# Patient Record
Sex: FEMALE
[De-identification: names, ages, dates, MRNs, and addresses within clinical notes are randomized per-mention and may not be internally consistent; named-entity substitution may affect disease eponyms.]

---

## 2017-05-27 NOTE — XRAY REPORT
FINAL REPORT



EXAM:  XR CHEST ROUTINE 2V



HISTORY:  sob/productive cough/fever 



COMPARISON:  None available. 



FINDINGS::  Frontal and lateral views of the chest obtained. Mild

cardiac enlargement. Prominent calcification aortic arch.

Prominent coronary artery calcifications. Shallow inspiration. No

focal consolidation or effusion. No pneumothorax. Moderate

degenerative changes of the thoracic spine. 



IMPRESSION::  Mild cardiac enlargement. Lungs are grossly clear.

## 2017-05-28 NOTE — EMERGENCY DEPARTMENT REPORT
- General


Chief Complaint: Dyspnea/Respdistress


Stated Complaint: CONGESTION/COLD SX


Time Seen by Provider: 05/27/17 23:40


Source: patient


Mode of arrival: Ambulatory


Limitations: No Limitations





- History of Present Illness


Initial Comments: 





82-year-old female with a past medical history of hypertension, dementia, 

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

, nasal congestion, generalized weakness, and body aches the last 2-3 days.  

Patient was put on Claritin and a nasal spray without improvement.  Patient 

complains of congestion to her nose and her sinuses.  No reports of fever, 

wheezing, shortness of breath.  Other than generalized aching that is 

intermittent and no significant pain reported at this time.





- Related Data


 Home Medications











 Medication  Instructions  Recorded  Confirmed  Last Taken


 


Alendronate Sodium [Fosamax] 70 mg PO QWEEK 08/30/16 08/30/16 Unknown


 


AtorvaSTATin [Lipitor] 40 mg PO DAILY 08/30/16 08/30/16 Unknown


 


Carvedilol [Coreg] 25 mg PO BID 08/30/16 08/30/16 Unknown


 


Fenofibrate [Tricor] 145 mg PO QDAY 08/30/16 08/30/16 Unknown


 


Hydrochlorothiazide [HCTZ] 50 mg PO QDAY 08/30/16 08/30/16 Unknown


 


Losartan [Cozaar] 100 mg PO QDAY 08/30/16 08/30/16 Unknown


 


Potassium Chloride [Klor-Con 8] 8 meq PO QDAY 08/30/16 08/30/16 Unknown


 


Tizanidine HCl [tiZANidine] 2 mg PO DAILY 08/30/16 08/30/16 Unknown


 


amLODIPine [Norvasc] 10 mg PO DAILY 08/30/16 08/30/16 Unknown








 Previous Rx's











 Medication  Instructions  Recorded  Last Taken  Type


 


Ibuprofen [Motrin] 600 mg PO Q8H PRN #10 tablet 08/30/16 Unknown Rx


 


Mupirocin [Bactroban 2%] 1 applic TP TID #1 tube 08/30/16 Unknown Rx


 


Levofloxacin [Levaquin TAB] 500 mg PO QDAY #5 tablet 05/28/17 Unknown Rx


 


Sodium Chloride [Saline Nasal 1 - 2 sprays NS PRN PRN #1 bottle 05/28/17 

Unknown Rx





Spray]    











 Allergies











Allergy/AdvReac Type Severity Reaction Status Date / Time


 


No Known Allergies Allergy   Unverified 08/30/16 09:30














ED Review of Systems


ROS: 


Stated complaint: CONGESTION/COLD SX


Other details as noted in HPI





Comment: All other systems reviewed and negative


Other: 





Constitutional: No fevers chills 


Eyes: No eye pain visual changes 


ENT: Nasal congestion


Neck: Denies pain


Respiratory: As per HPI


Cardiovascular: Denies chest pain, palpitations, syncope


GI: Denies abdominal pain, nausea, vomiting, diarrhea


: Denies dysuria


Musculoskeletal: Denies back pain, joint swelling 


Skin: Denies rash, lesions, erythema


Neurologic: Denies headache, numbness, weakness


Psychiatric: Denies suicidal ideation, hallucinations








ED Past Medical Hx





- Past Medical History


Hx Hypertension: Yes


Hx Dementia: Yes


Additional medical history: cardiac stent x2, DJD neck





- Surgical History


Hx Cholecystectomy: Yes


Additional Surgical History: bilat knee replacement





- Social History


Smoking Status: Never Smoker


Substance Use Type: None





- Medications


Home Medications: 


 Home Medications











 Medication  Instructions  Recorded  Confirmed  Last Taken  Type


 


Alendronate Sodium [Fosamax] 70 mg PO QWEEK 08/30/16 08/30/16 Unknown History


 


AtorvaSTATin [Lipitor] 40 mg PO DAILY 08/30/16 08/30/16 Unknown History


 


Carvedilol [Coreg] 25 mg PO BID 08/30/16 08/30/16 Unknown History


 


Fenofibrate [Tricor] 145 mg PO QDAY 08/30/16 08/30/16 Unknown History


 


Hydrochlorothiazide [HCTZ] 50 mg PO QDAY 08/30/16 08/30/16 Unknown History


 


Ibuprofen [Motrin] 600 mg PO Q8H PRN #10 tablet 08/30/16  Unknown Rx


 


Losartan [Cozaar] 100 mg PO QDAY 08/30/16 08/30/16 Unknown History


 


Mupirocin [Bactroban 2%] 1 applic TP TID #1 tube 08/30/16  Unknown Rx


 


Potassium Chloride [Klor-Con 8] 8 meq PO QDAY 08/30/16 08/30/16 Unknown History


 


Tizanidine HCl [tiZANidine] 2 mg PO DAILY 08/30/16 08/30/16 Unknown History


 


amLODIPine [Norvasc] 10 mg PO DAILY 08/30/16 08/30/16 Unknown History


 


Levofloxacin [Levaquin TAB] 500 mg PO QDAY #5 tablet 05/28/17  Unknown Rx


 


Sodium Chloride [Saline Nasal 1 - 2 sprays NS PRN PRN #1 bottle 05/28/17  

Unknown Rx





Spray]     














ED Physical Exam





- General


Limitations: No Limitations





- Other


Other exam information: 





General: No limitations, patient is alert in no acute distress


Head exam: Atraumatic, normocephalic


Eyes exam: Normal appearance


ENT: Moist mucous membrane, mild bilateral maxillary sinus tenderness


Neck exam: Normal inspection, full range of motion, no meningismus nontender


Respiratory exam: Clear to auscultation bilateral, no wheezes, rales, crackles


Cardiovascular: Normal rate and rhythm, normal heart sounds


Abdomen: Soft, nondistended, and  nontender, with normal bowel sounds, no 

rebound, or guarding


Extremity: Full range of motion normal inspection no deformity


Back: Normal Inspection, full range of motion, no tenderness


Neurologic: Alert, , cranial nerves intact, no motor or sensory deficit


Psychiatric: normal affect, normal mood


Skin: Warm, dry, intact





ED Course


 Vital Signs











  05/27/17





  21:50


 


Temperature 99.2 F


 


Pulse Rate 81


 


Respiratory 20





Rate 


 


Blood Pressure 123/52


 


O2 Sat by Pulse 96





Oximetry 














- Reevaluation(s)


Reevaluation #1: 








05/28/17 00:22


Levaquin ordered for UTI and possible sinusitis





ED Medical Decision Making





- Lab Data


Result diagrams: 


 05/27/17 22:01





 05/27/17 22:01








 Lab Results











  05/27/17 05/27/17 05/27/17 Range/Units





  22:01 22:01 23:16 


 


WBC  4.9    (4.5-11.0)  K/mm3


 


RBC  3.34 L    (3.65-5.03)  M/mm3


 


Hgb  11.1    (10.1-14.3)  gm/dl


 


Hct  32.4    (30.3-42.9)  %


 


MCV  97    (79-97)  fl


 


MCH  33 H    (28-32)  pg


 


MCHC  34    (30-34)  %


 


RDW  13.1 L    (13.2-15.2)  %


 


Plt Count  173    (140-440)  K/mm3


 


Sodium   139   (137-145)  mmol/L


 


Potassium   4.6   (3.6-5.0)  mmol/L


 


Chloride   100.7   ()  mmol/L


 


Carbon Dioxide   25   (22-30)  mmol/L


 


Anion Gap   18   mmol/L


 


BUN   26 H   (7-17)  mg/dL


 


Creatinine   0.8   (0.7-1.2)  mg/dL


 


Estimated GFR   > 60   ml/min


 


BUN/Creatinine Ratio   32.50   %


 


Glucose   92   ()  mg/dL


 


Calcium   8.7   (8.4-10.2)  mg/dL


 


Urine Color    Yellow  (Yellow)  


 


Urine Turbidity    Cloudy  (Clear)  


 


Urine pH    6.0  (5.0-7.0)  


 


Ur Specific Gravity    1.014  (1.003-1.030)  


 


Urine Protein    <15 mg/dl  (Negative)  mg/dL


 


Urine Glucose (UA)    Neg  (Negative)  mg/dL


 


Urine Ketones    Neg  (Negative)  mg/dL


 


Urine Blood    Neg  (Negative)  


 


Urine Nitrite    Neg  (Negative)  


 


Urine Bilirubin    Neg  (Negative)  


 


Urine Urobilinogen    4.0  (<2.0)  mg/dL


 


Ur Leukocyte Esterase    Lg  (Negative)  


 


Urine WBC (Auto)    82.0 H  (0.0-6.0)  /HPF


 


Urine RBC (Auto)    6.0  (0.0-6.0)  /HPF


 


U Epithel Cells (Auto)    11.0  (0-13.0)  /HPF


 


Urine Bacteria (Auto)    4+  (Negative)  /HPF


 


Hyaline Casts    2  /LPF


 


Urine Mucus    Few  /HPF














- Radiology Data


Radiology results: report reviewed (chest x-ray: Mild cardiomegaly no acute 

findings)





- Medical Decision Making





Patient be discharged on Levaquin to cover both UTI and sinusitis.  Urine 

culture ordered and pending.  Patient has follow-up with her doctor on Tuesday 

and therefore will be provided a copy of the labs for review by her physician.





- Differential Diagnosis


sinusitis, UTI, allergic rhinitis


Critical Care Time: No


Critical care attestation.: 


If time is entered above; I have spent that time in minutes in the direct care 

of this critically ill patient, excluding procedure time.








ED Disposition


Clinical Impression: 


 Sinusitis, Allergic rhinitis, UTI (urinary tract infection)





Disposition: DISCHARGED TO HOME OR SELFCARE


Is pt being admited?: No


Does the pt Need Aspirin: No


Condition: Stable


Instructions:  Sinusitis (ED), Urinary Tract Infection in Women (ED), Allergic 

Rhinitis (ED)


Additional Instructions: 


Take the medication as prescribed.  Follow-up with your doctor as scheduled.  

Take a copy of the labs results and chest x-ray results to your for follow-up


Prescriptions: 


Levofloxacin [Levaquin TAB] 500 mg PO QDAY #5 tablet


Sodium Chloride [Saline Nasal Spray] 1 - 2 sprays NS PRN PRN #1 bottle


 PRN Reason: Nasal Congestion


Referrals: 


PRIMARY CARE,MD [Primary Care Provider] - 3-5 Days


Time of Disposition: 00:28

## 2017-12-01 ENCOUNTER — HOSPITAL ENCOUNTER (OUTPATIENT)
Dept: HOSPITAL 5 - SPVWC | Age: 82
Discharge: HOME | End: 2017-12-01
Attending: FAMILY MEDICINE
Payer: MEDICARE
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
## 2017-12-01 DIAGNOSIS — I73.89: ICD-10-CM

## 2017-12-01 DIAGNOSIS — S09.90XA: Primary | ICD-10-CM
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
## 2017-12-01 DIAGNOSIS — G93.89: ICD-10-CM
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
## 2017-12-01 PROCEDURE — 70450 CT HEAD/BRAIN W/O DYE: CPT

## 2017-12-01 NOTE — CAT SCAN REPORT
CT HEAD WITHOUT CONTRAST: 12/01/17 10:39:00



CLINICAL: Headache.



TECHNIQUE: 2.5-mm noncontrast scans.



COMPARISON:06/06/17



FINDINGS: Slight enlargement of the lateral ventricles and third 

ventricle when compared to the prior exam.  The fourth ventricle is 

normal size. No mass or mass effect.  The sulci are unchanged in size.  

Slight increased bilateral periventricular white matter hypodensities. 

No hemorrhage, edema or extra-axial collection.  The sinuses are clear. 

 Normal orbits and soft tissues.  The calvarium and skull base are 

intact.



IMPRESSION: Mild ventriculomegaly with slight enlargement of the 

ventricles since the last exam.  However, no distinct etiology is 

identified for this change.  Moderate chronic white matter 

microangiopathy.  No evidence of acute/subacute infarct or hemorrhage.

## 2019-08-04 ENCOUNTER — HOSPITAL ENCOUNTER (INPATIENT)
Dept: HOSPITAL 5 - ED | Age: 84
LOS: 3 days | Discharge: NURSING, REHAB OR LONG-TERM CARE | DRG: 760 | End: 2019-08-07
Attending: INTERNAL MEDICINE | Admitting: INTERNAL MEDICINE
Payer: MEDICARE

## 2019-08-04 DIAGNOSIS — E78.5: ICD-10-CM

## 2019-08-04 DIAGNOSIS — Z90.49: ICD-10-CM

## 2019-08-04 DIAGNOSIS — I25.10: ICD-10-CM

## 2019-08-04 DIAGNOSIS — E87.6: ICD-10-CM

## 2019-08-04 DIAGNOSIS — N85.8: Primary | ICD-10-CM

## 2019-08-04 DIAGNOSIS — N95.0: ICD-10-CM

## 2019-08-04 DIAGNOSIS — I11.0: ICD-10-CM

## 2019-08-04 DIAGNOSIS — Z96.653: ICD-10-CM

## 2019-08-04 DIAGNOSIS — I50.9: ICD-10-CM

## 2019-08-04 DIAGNOSIS — Z95.5: ICD-10-CM

## 2019-08-04 DIAGNOSIS — F03.90: ICD-10-CM

## 2019-08-04 DIAGNOSIS — Z66: ICD-10-CM

## 2019-08-04 DIAGNOSIS — E43: ICD-10-CM

## 2019-08-04 PROCEDURE — 93010 ELECTROCARDIOGRAM REPORT: CPT

## 2019-08-04 PROCEDURE — 81001 URINALYSIS AUTO W/SCOPE: CPT

## 2019-08-04 PROCEDURE — 85014 HEMATOCRIT: CPT

## 2019-08-04 PROCEDURE — 76856 US EXAM PELVIC COMPLETE: CPT

## 2019-08-04 PROCEDURE — 85007 BL SMEAR W/DIFF WBC COUNT: CPT

## 2019-08-04 PROCEDURE — 93005 ELECTROCARDIOGRAM TRACING: CPT

## 2019-08-04 PROCEDURE — 85730 THROMBOPLASTIN TIME PARTIAL: CPT

## 2019-08-04 PROCEDURE — 80053 COMPREHEN METABOLIC PANEL: CPT

## 2019-08-04 PROCEDURE — 85610 PROTHROMBIN TIME: CPT

## 2019-08-04 PROCEDURE — 85025 COMPLETE CBC W/AUTO DIFF WBC: CPT

## 2019-08-04 PROCEDURE — 80048 BASIC METABOLIC PNL TOTAL CA: CPT

## 2019-08-04 PROCEDURE — 36415 COLL VENOUS BLD VENIPUNCTURE: CPT

## 2019-08-04 PROCEDURE — 85018 HEMOGLOBIN: CPT

## 2019-08-05 LAB
ALBUMIN SERPL-MCNC: 2.4 G/DL (ref 3.9–5)
ALT SERPL-CCNC: 8 UNITS/L (ref 7–56)
APTT BLD: 28 SEC. (ref 24.2–36.6)
BAND NEUTROPHILS # (MANUAL): 0 K/MM3
BUN SERPL-MCNC: 10 MG/DL (ref 7–17)
BUN SERPL-MCNC: 8 MG/DL (ref 7–17)
BUN/CREAT SERPL: 27 %
BUN/CREAT SERPL: 33 %
CALCIUM SERPL-MCNC: 7.8 MG/DL (ref 8.4–10.2)
CALCIUM SERPL-MCNC: 8.1 MG/DL (ref 8.4–10.2)
HCT VFR BLD CALC: 33.2 % (ref 30.3–42.9)
HCT VFR BLD CALC: 33.7 % (ref 30.3–42.9)
HEMOLYSIS INDEX: 14
HEMOLYSIS INDEX: 5
HGB BLD-MCNC: 11.2 GM/DL (ref 10.1–14.3)
HGB BLD-MCNC: 11.6 GM/DL (ref 10.1–14.3)
INR PPP: 1.03 (ref 0.87–1.13)
MCHC RBC AUTO-ENTMCNC: 34 % (ref 30–34)
MCV RBC AUTO: 98 FL (ref 79–97)
MYELOCYTES # (MANUAL): 0 K/MM3
PLATELET # BLD: 160 K/MM3 (ref 140–440)
PROMYELOCYTES # (MANUAL): 0 K/MM3
RBC # BLD AUTO: 3.41 M/MM3 (ref 3.65–5.03)
TOTAL CELLS COUNTED BLD: 100

## 2019-08-05 RX ADMIN — CARVEDILOL SCH MG: 12.5 TABLET, FILM COATED ORAL at 11:23

## 2019-08-05 RX ADMIN — POTASSIUM CHLORIDE SCH MLS/HR: 10 INJECTION, SOLUTION INTRAVENOUS at 03:34

## 2019-08-05 RX ADMIN — CARVEDILOL SCH MG: 12.5 TABLET, FILM COATED ORAL at 21:47

## 2019-08-05 RX ADMIN — POTASSIUM CHLORIDE SCH MLS/HR: 10 INJECTION, SOLUTION INTRAVENOUS at 06:20

## 2019-08-05 RX ADMIN — POTASSIUM CHLORIDE SCH MLS/HR: 10 INJECTION, SOLUTION INTRAVENOUS at 02:10

## 2019-08-05 RX ADMIN — DOCUSATE SODIUM SCH: 100 CAPSULE, LIQUID FILLED ORAL at 11:23

## 2019-08-05 RX ADMIN — Medication SCH ML: at 11:24

## 2019-08-05 RX ADMIN — DOCUSATE SODIUM SCH: 100 CAPSULE, LIQUID FILLED ORAL at 21:47

## 2019-08-05 RX ADMIN — Medication SCH: at 21:49

## 2019-08-05 RX ADMIN — POTASSIUM CHLORIDE SCH MLS/HR: 10 INJECTION, SOLUTION INTRAVENOUS at 05:02

## 2019-08-05 RX ADMIN — SODIUM CHLORIDE SCH MLS/HR: 0.9 INJECTION, SOLUTION INTRAVENOUS at 05:58

## 2019-08-05 RX ADMIN — SODIUM CHLORIDE SCH MLS/HR: 0.9 INJECTION, SOLUTION INTRAVENOUS at 20:39

## 2019-08-05 RX ADMIN — AMLODIPINE BESYLATE SCH MG: 10 TABLET ORAL at 11:23

## 2019-08-05 NOTE — PROGRESS NOTE
Assessment and Plan


Assessment and plan: 


Patient is a 85-year-old  woman who lives in an assisted living 

facility under [facility] with history of CAD s/p stent x2, HTN, dementia 

since [facility] ED via EMS with complaints of postmenopausal vaginal bleeding for the

past 18 hours.





Hypokalemia


-Potassium on admission 2.6


-Potassium repleted


-Continue to monitor electrolytes


-Replete as needed





Postmenopausal vaginal bleeding


-Evidence of blood at the introitus of the vaginal vault


-Secondary to uterine mass


-GYN consulted





Uterine mass


-Heterogeneous mass in the uterine fundus containing calcifications measuring 

4.3 x 3.6 x 3.3 cm. 


-GYN consulted





Moderate to severe malnutrition


-Albumin on admission 2.6


-Encourage oral intake


-Dietitian consult pending





HTN


-Monitor BP


-Resume home antihypertensive meds to optimize BP, once medication 

reconciliation has been completed


-IV hydralazine when necessary





CAD


-S/p stent x2


-Echo 6/8/17 showed: EF 55-60%, impaired relaxation, mild MR, mild TR, RVSP 

48mmHg, mild pulmonary HTN





DVT PPX


-on SCD's


-on Heparin





History of dementia





Medication reconciliation pending





prolonged inpatient services 32 minutes (admitted this morning)





ADvance directive says DNR, will order








History


Interval history: 


Patient was seen and examined. Follow-up on current diagnosis. No overnight 

events reported to me. Patient denies any chest pain, shortness breath, 

nausea/vomiting or severe headaches. Imaging, nursing note, chart, labs and old 

chart reviewed. Discussed with patient. 








Hospitalist Physical





- Physical exam


Narrative exam: 


Gen: WDWN, NAD, Awake, Alert, Orientated 


HEENT: NCAT, EOMI, PERRL, OP Clear 


Neck: supple, no adenopathy, no thyromegaly, no JVD 


CVS/Heart: RRR, normal S1S2, pulses present bilaterally 


Chest/Lungs: CTA B, Symmetrical chest expansion, good air entry bilaterally 


GI/Abdomen: soft, NTND, good bowel sounds, no guarding or rebound 


/Bladder: no suprapubic tenderness, no CVA or paraspinal tenderness 


Extermity/Skin: no c/c/e, no obvious rash 


MSK: FROM x 4 


Neuro: CN 2-12 grossly intact, no new focal deficits 


Psych: calm 








- Constitutional


Vitals: 


                                        











Temp Pulse Resp BP Pulse Ox


 


 97.7 F   70   20   162/78   96 


 


 08/05/19 06:04  08/05/19 06:04  08/05/19 06:04  08/05/19 06:04  08/05/19 06:04














Results





- Labs


CBC & Chem 7: 


                                 08/05/19 05:56





                                 08/05/19 00:25


Labs: 


                             Laboratory Last Values











WBC  4.1 K/mm3 (4.5-11.0)  L  08/05/19  00:25    


 


RBC  3.41 M/mm3 (3.65-5.03)  L  08/05/19  00:25    


 


Hgb  11.6 gm/dl (10.1-14.3)   08/05/19  05:56    


 


Hct  33.7 % (30.3-42.9)   08/05/19  05:56    


 


MCV  98 fl (79-97)  H  08/05/19  00:25    


 


MCH  33 pg (28-32)  H  08/05/19  00:25    


 


MCHC  34 % (30-34)   08/05/19  00:25    


 


RDW  15.9 % (13.2-15.2)  H  08/05/19  00:25    


 


Plt Count  160 K/mm3 (140-440)   08/05/19  00:25    


 


Lymph % (Auto)  Np   08/05/19  00:25    


 


Add Manual Diff  Complete   08/05/19  00:25    


 


Total Counted  100   08/05/19  00:25    


 


Seg Neutrophils %  Np   08/05/19  00:25    


 


Seg Neuts % (Manual)  29.0 % (40.0-70.0)  L  08/05/19  00:25    


 


  0 %  08/05/19  00:25    


 


  60.0 % (13.4-35.0)  H  08/05/19  00:25    


 


Reactive Lymphs % (Man)  0 %  08/05/19  00:25    


 


  10.0 % (0.0-7.3)  H  08/05/19  00:25    


 


  0 % (0.0-4.3)   08/05/19  00:25    


 


  1.0 % (0.0-1.8)   08/05/19  00:25    


 


  0 %  08/05/19  00:25    


 


  0 %  08/05/19  00:25    


 


  0 %  08/05/19  00:25    


 


  0 %  08/05/19  00:25    


 


Nucleated RBC %  Not Reportable   08/05/19  00:25    


 


Seg Neutrophils # Man  1.2 K/mm3 (1.8-7.7)  L  08/05/19  00:25    


 


Band Neutrophils #  0.0 K/mm3  08/05/19  00:25    


 


  2.5 K/mm3 (1.2-5.4)   08/05/19  00:25    


 


Abs React Lymphs (Man)  0.0 K/mm3  08/05/19  00:25    


 


  0.4 K/mm3 (0.0-0.8)   08/05/19  00:25    


 


  0.0 K/mm3 (0.0-0.4)   08/05/19  00:25    


 


  0.0 K/mm3 (0.0-0.1)   08/05/19  00:25    


 


  0.0 K/mm3  08/05/19  00:25    


 


  0.0 K/mm3  08/05/19  00:25    


 


  0.0 K/mm3  08/05/19  00:25    


 


Blast Cells #  0.0 K/mm3  08/05/19  00:25    


 


WBC Morphology  Not Reportable   08/05/19  00:25    


 


Hypersegmented Neuts  Not Reportable   08/05/19  00:25    


 


Hyposegmented Neuts  Not Reportable   08/05/19  00:25    


 


Hypogranular Neuts  Not Reportable   08/05/19  00:25    


 


  Not Reportable   08/05/19  00:25    


 


  Not Reportable   08/05/19  00:25    


 


  Not Reportable   08/05/19  00:25    


 


  Not Reportable   08/05/19  00:25    


 


  Not Reportable   08/05/19  00:25    


 


  Not Reportable   08/05/19  00:25    


 


  Not Reportable   08/05/19  00:25    


 


  Not Reportable   08/05/19  00:25    


 


Plt Clumps, EDTA  Not Reportable   08/05/19  00:25    


 


  Not Reportable   08/05/19  00:25    


 


  Not Reportable   08/05/19  00:25    


 


  Not Reportable   08/05/19  00:25    


 


Plt Morphology Comment  Not Reportable   08/05/19  00:25    


 


RBC Morphology  Normal   08/05/19  00:25    


 


Dimorphic RBCs  Not Reportable   08/05/19  00:25    


 


  Not Reportable   08/05/19  00:25    


 


  Not Reportable   08/05/19  00:25    


 


  Not Reportable   08/05/19  00:25    


 


  Not Reportable   08/05/19  00:25    


 


  Not Reportable   08/05/19  00:25    


 


  Not Reportable   08/05/19  00:25    


 


  Not Reportable   08/05/19  00:25    


 


  Not Reportable   08/05/19  00:25    


 


  Not Reportable   08/05/19  00:25    


 


  Not Reportable   08/05/19  00:25    


 


  Not Reportable   08/05/19  00:25    


 


  Not Reportable   08/05/19  00:25    


 


  Not Reportable   08/05/19  00:25    


 


  Not Reportable   08/05/19  00:25    


 


  Not Reportable   08/05/19  00:25    


 


  Not Reportable   08/05/19  00:25    


 


  Not Reportable   08/05/19  00:25    


 


  Not Reportable   08/05/19  00:25    


 


  Not Reportable   08/05/19  00:25    


 


Acanthocytes (Spur)  Not Reportable   08/05/19  00:25    


 


Rouleaux  Not Reportable   08/05/19  00:25    


 


  Not Reportable   08/05/19  00:25    


 


  Not Reportable   08/05/19  00:25    


 


  Not Reportable   08/05/19  00:25    


 


  Not Reportable   08/05/19  00:25    


 


Hem Pathologist Commnt  No   08/05/19  00:25    


 


PT  13.2 Sec. (12.2-14.9)   08/05/19  00:25    


 


INR  1.03  (0.87-1.13)   08/05/19  00:25    


 


APTT  28.0 Sec. (24.2-36.6)   08/05/19  00:25    


 


Sodium  145 mmol/L (137-145)   08/05/19  00:25    


 


Potassium  2.6 mmol/L (3.6-5.0)  L*  08/05/19  00:25    


 


Chloride  104.6 mmol/L ()   08/05/19  00:25    


 


Carbon Dioxide  32 mmol/L (22-30)  H  08/05/19  00:25    


 


  11 mmol/L  08/05/19  00:25    


 


BUN  10 mg/dL (7-17)   08/05/19  00:25    


 


  0.3 mg/dL (0.7-1.2)  L  08/05/19  00:25    


 


Estimated GFR  > 60 ml/min  08/05/19  00:25    


 


  33 %  08/05/19  00:25    


 


Glucose  74 mg/dL ()   08/05/19  00:25    


 


Calcium  7.8 mg/dL (8.4-10.2)  L  08/05/19  00:25    


 


  0.60 mg/dL (0.1-1.2)   08/05/19  00:25    


 


AST  39 units/L (5-40)   08/05/19  00:25    


 


ALT  8 units/L (7-56)   08/05/19  00:25    


 


  93 units/L ()   08/05/19  00:25    


 


  5.9 g/dL (6.3-8.2)  L  08/05/19  00:25    


 


  2.4 g/dL (3.9-5)  L  08/05/19  00:25    


 


  0.7 %  08/05/19  00:25    














Active Medications





- Current Medications


Current Medications: 














Generic Name Dose Route Start Last Admin





  Trade Name Freq  PRN Reason Stop Dose Admin


 


Acetaminophen  650 mg  08/05/19 03:05 





  Tylenol  PO  





  Q4H PRN  





  Pain MILD(1-3)/Fever >100.5/HA  


 


Amlodipine Besylate  10 mg  08/05/19 10:00 





  Norvasc  PO  





  QDAY [facility]  


 


Atorvastatin Calcium  40 mg  08/05/19 10:00 





  Lipitor  PO  





  DAILY [facility]  


 


Carvedilol  12.5 mg  08/05/19 10:00 





  Coreg  PO  





  BID [facility]  


 


Docusate Sodium  100 mg  08/05/19 10:00 





  Colace  PO  





  BID [facility]  


 


Hydralazine HCl  10 mg  08/05/19 03:09 





  Apresoline  IV  





  Q4HR PRN  





  Blood Pressure  


 


Sodium Chloride  1,000 mls @ 75 mls/hr  08/05/19 04:00  08/05/19 05:58





  Nacl 0.9% 1000 Ml  IV   75 mls/hr





  AS DIRECT REY   Administration


 


Ondansetron HCl  4 mg  08/05/19 03:05 





  Zofran  IV  





  Q8H PRN  





  Nausea And Vomiting  


 


Sodium Chloride  10 ml  08/05/19 10:00 





  Sodium Chloride Flush Syringe 10 Ml  IV  





  BID REY  


 


Sodium Chloride  10 ml  08/05/19 03:05 





  Sodium Chloride Flush Syringe 10 Ml  IV  





  PRN PRN  





  LINE FLUSH

## 2019-08-05 NOTE — ULTRASOUND REPORT
Transabdominal pelvic ultrasound



INDICATION: Abnormal bleeding



FINDINGS: Uterus measures 9.4 x 5.6 x 4.2 cm. There is a heterogeneous mass in the uterine fundus con
taining calcifications measuring 4.3 x 3.6 x 3.3 cm. Neither ovary is seen. No free fluid identified.




IMPRESSION: The uterine mass does not appear typical for a fibroid and malignancy would need to be co
nsidered.



Signer Name: Galo Martin MD 

Signed: 8/5/2019 1:27 AM

 Workstation Name: [id number]

## 2019-08-05 NOTE — HISTORY AND PHYSICAL REPORT
<BEN GRAVES - Last Filed: 08/05/19 03:36>





History of Present Illness


Date of examination: 08/05/19


Date of admission: 


08/05/2019


Chief complaint: 





Postmenopausal Vaginal bleeding


History of present illness: 





85-year-old  female who was side in assisted living facility with 

history of CAD s/p stent x2, HTN, dementia since [facility] ED via EMS with complaints

of postmenopausal vaginal bleeding.  Patient is a poor historian and unable to 

provide history.  History is provided EMS report and review of medical records. 

According to the assisted living staff patient has been experiencing vaginal 

bleeding for the past 18 hours. Pt  has been filling up her diapers with bloody 

contents. Pt states that she feels fine and there is nothing wrong with her. Pt 

also stated that she can't spend too much time with me because she has to go to 

.





Past History


Past Medical History: CAD (with ischemia, s/p stent x2), heart failure, 

hypertension, hyperlipidemia, other (degenerative joint disease neck, dementia, 

mild pulmonary hypertension)


Past Surgical History: cholecystectomy, Other (bilat knee replacement)


Social history: other (lives in assisted living facility)





Medications and Allergies


                                    Allergies











Allergy/AdvReac Type Severity Reaction Status Date / Time


 


No Known Allergies Allergy   Unverified 08/30/16 09:30











                                Home Medications











 Medication  Instructions  Recorded  Confirmed  Last Taken  Type


 


ALPRAZolam 0.5 mg PO Q4H PRN 08/05/19 08/05/19 Unknown History


 


Acetaminophen 650 mg PO Q4H PRN 08/05/19 08/05/19 Unknown History


 


LORazepam 1 mg PO Q4H PRN 08/05/19 08/05/19 Unknown History


 


Ondansetron 4 mg PO Q4H PRN 08/05/19 08/05/19 Unknown History


 


SEROquel 100 mg PO BID 08/05/19 08/05/19 Unknown History











Active Meds: 


Active Medications





Acetaminophen (Tylenol)  650 mg PO Q4H PRN


   PRN Reason: Pain MILD(1-3)/Fever >100.5/HA


Amlodipine Besylate (Norvasc)  10 mg PO QDAY REY


Atorvastatin Calcium (Lipitor)  40 mg PO DAILY REY


Carvedilol (Coreg)  12.5 mg PO BID REY


Docusate Sodium (Colace)  100 mg PO BID REY


Hydralazine HCl (Apresoline)  10 mg IV Q4HR PRN


   PRN Reason: Blood Pressure


Potassium Chloride (Kcl 10meq/100ml)  10 meq in 100 mls @ 100 mls/hr IV Q1H REY


   Stop: 08/05/19 05:59


   Last Admin: 08/05/19 02:10 Dose:  100 mls/hr


   Documented by: 


Sodium Chloride (Nacl 0.9% 1000 Ml)  1,000 mls @ 75 mls/hr IV AS DIRECT REY


Ondansetron HCl (Zofran)  4 mg IV Q8H PRN


   PRN Reason: Nausea And Vomiting


Sodium Chloride (Sodium Chloride Flush Syringe 10 Ml)  10 ml IV BID REY


Sodium Chloride (Sodium Chloride Flush Syringe 10 Ml)  10 ml IV PRN PRN


   PRN Reason: LINE FLUSH











Review of Systems


ROS unobtainable: due to mental status





Exam





- Physical Exam


Narrative exam: 





Physical exam





General appearance: Present: No acute distress, awake, oriented to self, older 

adult  female





- EENT


Eyes: Present: PERRL, EOM intact


ENT: hearing intact, normal dentition





- Neck


Neck: Present: supple, normal ROM





- Respiratory


Respiratory effort: Non-labored


Respiratory: bilateral: diminished (bases)





- Cardiovascular


Heart rate:  66(bpm)


Rhythm: SR


Heart Sounds: Present: S1 & S2.  Absent: rub, click





- Extremities


Extremities: no ischemia, pulses intact,








- Peripheral Assessment


Peripheral Pulses: within normal limits


 


- Abdominal


General gastrointestinal: soft, non-tender, normal bowel sounds





Pelvic:Evidence of blood at the introitus of the vaginal vault.  No active 

hemorrhaging visualized externally





- Integumentary


Integumentary: Present: warm, dry





- Musculoskeletal


Musculoskeletal: Able to move all extremities





-Neurological


Neurological: CN II-XII grossly intact





- Psychiatric


Psychiatric: Confused but cooperative





- Constitutional


Vitals: 


                                        











Temp Pulse Resp BP Pulse Ox


 


 98.9 F   74   16      95 


 


 08/05/19 00:05  08/05/19 00:05  08/05/19 00:52     08/05/19 00:05














Results





- Labs


CBC & Chem 7: 


                                 08/05/19 00:25





                                 08/05/19 00:25


Labs: 


                             Laboratory Last Values











WBC  4.1 K/mm3 (4.5-11.0)  L  08/05/19  00:25    


 


RBC  3.41 M/mm3 (3.65-5.03)  L  08/05/19  00:25    


 


Hgb  11.2 gm/dl (10.1-14.3)   08/05/19  00:25    


 


Hct  33.2 % (30.3-42.9)   08/05/19  00:25    


 


MCV  98 fl (79-97)  H  08/05/19  00:25    


 


MCH  33 pg (28-32)  H  08/05/19  00:25    


 


MCHC  34 % (30-34)   08/05/19  00:25    


 


RDW  15.9 % (13.2-15.2)  H  08/05/19  00:25    


 


Plt Count  160 K/mm3 (140-440)   08/05/19  00:25    


 


Lymph % (Auto)  Np   08/05/19  00:25    


 


Add Manual Diff  Complete   08/05/19  00:25    


 


Total Counted  100   08/05/19  00:25    


 


Seg Neutrophils %  Np   08/05/19  00:25    


 


Seg Neuts % (Manual)  29.0 % (40.0-70.0)  L  08/05/19  00:25    


 


  0 %  08/05/19  00:25    


 


  60.0 % (13.4-35.0)  H  08/05/19  00:25    


 


Reactive Lymphs % (Man)  0 %  08/05/19  00:25    


 


  10.0 % (0.0-7.3)  H  08/05/19  00:25    


 


  0 % (0.0-4.3)   08/05/19  00:25    


 


  1.0 % (0.0-1.8)   08/05/19  00:25    


 


  0 %  08/05/19  00:25    


 


  0 %  08/05/19  00:25    


 


  0 %  08/05/19  00:25    


 


  0 %  08/05/19  00:25    


 


Nucleated RBC %  Not Reportable   08/05/19  00:25    


 


Seg Neutrophils # Man  1.2 K/mm3 (1.8-7.7)  L  08/05/19  00:25    


 


Band Neutrophils #  0.0 K/mm3  08/05/19  00:25    


 


  2.5 K/mm3 (1.2-5.4)   08/05/19  00:25    


 


Abs React Lymphs (Man)  0.0 K/mm3  08/05/19  00:25    


 


  0.4 K/mm3 (0.0-0.8)   08/05/19  00:25    


 


  0.0 K/mm3 (0.0-0.4)   08/05/19  00:25    


 


  0.0 K/mm3 (0.0-0.1)   08/05/19  00:25    


 


  0.0 K/mm3  08/05/19  00:25    


 


  0.0 K/mm3  08/05/19  00:25    


 


  0.0 K/mm3  08/05/19  00:25    


 


Blast Cells #  0.0 K/mm3  08/05/19  00:25    


 


WBC Morphology  Not Reportable   08/05/19  00:25    


 


Hypersegmented Neuts  Not Reportable   08/05/19  00:25    


 


Hyposegmented Neuts  Not Reportable   08/05/19  00:25    


 


Hypogranular Neuts  Not Reportable   08/05/19  00:25    


 


  Not Reportable   08/05/19  00:25    


 


  Not Reportable   08/05/19  00:25    


 


  Not Reportable   08/05/19  00:25    


 


  Not Reportable   08/05/19  00:25    


 


  Not Reportable   08/05/19  00:25    


 


  Not Reportable   08/05/19  00:25    


 


  Not Reportable   08/05/19  00:25    


 


  Not Reportable   08/05/19  00:25    


 


Plt Clumps, EDTA  Not Reportable   08/05/19  00:25    


 


  Not Reportable   08/05/19  00:25    


 


  Not Reportable   08/05/19  00:25    


 


  Not Reportable   08/05/19  00:25    


 


Plt Morphology Comment  Not Reportable   08/05/19  00:25    


 


RBC Morphology  Normal   08/05/19  00:25    


 


Dimorphic RBCs  Not Reportable   08/05/19  00:25    


 


  Not Reportable   08/05/19  00:25    


 


  Not Reportable   08/05/19  00:25    


 


  Not Reportable   08/05/19  00:25    


 


  Not Reportable   08/05/19  00:25    


 


  Not Reportable   08/05/19  00:25    


 


  Not Reportable   08/05/19  00:25    


 


  Not Reportable   08/05/19  00:25    


 


  Not Reportable   08/05/19  00:25    


 


  Not Reportable   08/05/19  00:25    


 


  Not Reportable   08/05/19  00:25    


 


  Not Reportable   08/05/19  00:25    


 


  Not Reportable   08/05/19  00:25    


 


  Not Reportable   08/05/19  00:25    


 


  Not Reportable   08/05/19  00:25    


 


  Not Reportable   08/05/19  00:25    


 


  Not Reportable   08/05/19  00:25    


 


  Not Reportable   08/05/19  00:25    


 


  Not Reportable   08/05/19  00:25    


 


  Not Reportable   08/05/19  00:25    


 


Acanthocytes (Spur)  Not Reportable   08/05/19  00:25    


 


Rouleaux  Not Reportable   08/05/19  00:25    


 


  Not Reportable   08/05/19  00:25    


 


  Not Reportable   08/05/19  00:25    


 


  Not Reportable   08/05/19  00:25    


 


  Not Reportable   08/05/19  00:25    


 


Hem Pathologist Commnt  No   08/05/19  00:25    


 


PT  13.2 Sec. (12.2-14.9)   08/05/19  00:25    


 


INR  1.03  (0.87-1.13)   08/05/19  00:25    


 


APTT  28.0 Sec. (24.2-36.6)   08/05/19  00:25    


 


Sodium  145 mmol/L (137-145)   08/05/19  00:25    


 


Potassium  2.6 mmol/L (3.6-5.0)  L*  08/05/19  00:25    


 


Chloride  104.6 mmol/L ()   08/05/19  00:25    


 


Carbon Dioxide  32 mmol/L (22-30)  H  08/05/19  00:25    


 


  11 mmol/L  08/05/19  00:25    


 


BUN  10 mg/dL (7-17)   08/05/19  00:25    


 


  0.3 mg/dL (0.7-1.2)  L  08/05/19  00:25    


 


Estimated GFR  > 60 ml/min  08/05/19  00:25    


 


  33 %  08/05/19  00:25    


 


Glucose  74 mg/dL ()   08/05/19  00:25    


 


Calcium  7.8 mg/dL (8.4-10.2)  L  08/05/19  00:25    


 


  0.60 mg/dL (0.1-1.2)   08/05/19  00:25    


 


AST  39 units/L (5-40)   08/05/19  00:25    


 


ALT  8 units/L (7-56)   08/05/19  00:25    


 


  93 units/L ()   08/05/19  00:25    


 


  5.9 g/dL (6.3-8.2)  L  08/05/19  00:25    


 


  2.4 g/dL (3.9-5)  L  08/05/19  00:25    


 


  0.7 %  08/05/19  00:25    














- Imaging and Cardiology


Imaging and Cardiology: 





Pelvic ultrasound:


Findings: Uterus measures 9.4 x 5.6 x 4.2 cm. There is a heterogeneous mass in 

the uterine fundus 


containing calcifications measuring 4.3 x 3.6 x 3.3 cm. Neither ovary is seen. 

No free fluid 


identified. 





Impressions: The uterine mass does not appear typical for a fibroid and 

malignancy would need to be 


considered. 








Assessment and Plan


Assessment and plan: 





85-year-old  female who was side in assisted living facility with 

history of CAD s/p stent x2, HTN, dementia since [facility] ED via EMS with complaints

of postmenopausal vaginal bleeding for the past 18 hours.





Hypokalemia


-Potassium on admission 2.6


-Potassium repleted


-Continue to monitor electrolytes


-Replete as needed





Postmenopausal vaginal bleeding


-Evidence of blood at the introitus of the vaginal vault


-Secondary to uterine mass


-GYN consulted





Uterine mass


-Heterogeneous mass in the uterine fundus containing calcifications measuring 

4.3 x 3.6 x 3.3 cm. 


-GYN consulted





Moderate to severe malnutrition


-Albumin on admission 2.6


-Encourage oral intake


-Dietitian consult pending





HTN


-Monitor BP


-Resume home antihypertensive meds to optimize BP, once medication recon

ciliation has been completed


-IV hydralazine when necessary





CAD


-S/p stent x2


-Echo 6/8/17 showed: EF 55-60%, impaired relaxation, mild MR, mild TR, RVSP 

48mmHg, mild pulmonary HTN





DVT PPX


-on SCD's


-on Heparin





History of dementia





Medication reconciliation pending





Advance Directives: No


VTE prophylaxis?: Chemical, Mechanical


Plan of care discussed with patient/family: Yes





<ANNMARIE RAMSAY - Last Filed: 08/05/19 05:23>





History of Present Illness


Date of admission: 


08/05/19 03:05








Medications and Allergies


Active Meds: 


Active Medications





Acetaminophen (Tylenol)  650 mg PO Q4H PRN


   PRN Reason: Pain MILD(1-3)/Fever >100.5/HA


Amlodipine Besylate (Norvasc)  10 mg PO QDAY REY


Atorvastatin Calcium (Lipitor)  40 mg PO DAILY REY


Carvedilol (Coreg)  12.5 mg PO BID [facility]


Docusate Sodium (Colace)  100 mg PO BID [facility]


Heparin Sodium (Porcine) (Heparin)  5,000 unit SUB-Q Q12HR [facility]


Hydralazine HCl (Apresoline)  10 mg IV Q4HR PRN


   PRN Reason: Blood Pressure


Potassium Chloride (Kcl 10meq/100ml)  10 meq in 100 mls @ 100 mls/hr IV Q1H REY


   Stop: 08/05/19 05:59


   Last Admin: 08/05/19 05:02 Dose:  100 mls/hr


   Documented by: 


Sodium Chloride (Nacl 0.9% 1000 Ml)  1,000 mls @ 75 mls/hr IV AS DIRECT REY


Ondansetron HCl (Zofran)  4 mg IV Q8H PRN


   PRN Reason: Nausea And Vomiting


Sodium Chloride (Sodium Chloride Flush Syringe 10 Ml)  10 ml IV BID REY


Sodium Chloride (Sodium Chloride Flush Syringe 10 Ml)  10 ml IV PRN PRN


   PRN Reason: LINE FLUSH











Exam





- Constitutional


Vitals: 


                                        











Temp Pulse Resp BP Pulse Ox


 


 98.9 F   71   20   164/77   100 


 


 08/05/19 00:05  08/05/19 05:00  08/05/19 05:00  08/05/19 05:00  08/05/19 05:00














Results





- Labs


CBC & Chem 7: 


                                 08/05/19 00:25





                                 08/05/19 00:25


Labs: 


                             Laboratory Last Values











WBC  4.1 K/mm3 (4.5-11.0)  L  08/05/19  00:25    


 


RBC  3.41 M/mm3 (3.65-5.03)  L  08/05/19  00:25    


 


Hgb  11.2 gm/dl (10.1-14.3)   08/05/19  00:25    


 


Hct  33.2 % (30.3-42.9)   08/05/19  00:25    


 


MCV  98 fl (79-97)  H  08/05/19  00:25    


 


MCH  33 pg (28-32)  H  08/05/19  00:25    


 


MCHC  34 % (30-34)   08/05/19  00:25    


 


RDW  15.9 % (13.2-15.2)  H  08/05/19  00:25    


 


Plt Count  160 K/mm3 (140-440)   08/05/19  00:25    


 


Lymph % (Auto)  Np   08/05/19  00:25    


 


Add Manual Diff  Complete   08/05/19  00:25    


 


Total Counted  100   08/05/19  00:25    


 


Seg Neutrophils %  Np   08/05/19  00:25    


 


Seg Neuts % (Manual)  29.0 % (40.0-70.0)  L  08/05/19  00:25    


 


  0 %  08/05/19  00:25    


 


  60.0 % (13.4-35.0)  H  08/05/19  00:25    


 


Reactive Lymphs % (Man)  0 %  08/05/19  00:25    


 


  10.0 % (0.0-7.3)  H  08/05/19  00:25    


 


  0 % (0.0-4.3)   08/05/19  00:25    


 


  1.0 % (0.0-1.8)   08/05/19  00:25    


 


  0 %  08/05/19  00:25    


 


  0 %  08/05/19  00:25    


 


  0 %  08/05/19  00:25    


 


  0 %  08/05/19  00:25    


 


Nucleated RBC %  Not Reportable   08/05/19  00:25    


 


Seg Neutrophils # Man  1.2 K/mm3 (1.8-7.7)  L  08/05/19  00:25    


 


Band Neutrophils #  0.0 K/mm3  08/05/19  00:25    


 


  2.5 K/mm3 (1.2-5.4)   08/05/19  00:25    


 


Abs React Lymphs (Man)  0.0 K/mm3  08/05/19  00:25    


 


  0.4 K/mm3 (0.0-0.8)   08/05/19  00:25    


 


  0.0 K/mm3 (0.0-0.4)   08/05/19  00:25    


 


  0.0 K/mm3 (0.0-0.1)   08/05/19  00:25    


 


  0.0 K/mm3  08/05/19  00:25    


 


  0.0 K/mm3  08/05/19  00:25    


 


  0.0 K/mm3  08/05/19  00:25    


 


Blast Cells #  0.0 K/mm3  08/05/19  00:25    


 


WBC Morphology  Not Reportable   08/05/19  00:25    


 


Hypersegmented Neuts  Not Reportable   08/05/19  00:25    


 


Hyposegmented Neuts  Not Reportable   08/05/19  00:25    


 


Hypogranular Neuts  Not Reportable   08/05/19  00:25    


 


  Not Reportable   08/05/19  00:25    


 


  Not Reportable   08/05/19  00:25    


 


  Not Reportable   08/05/19  00:25    


 


  Not Reportable   08/05/19  00:25    


 


  Not Reportable   08/05/19  00:25    


 


  Not Reportable   08/05/19  00:25    


 


  Not Reportable   08/05/19  00:25    


 


  Not Reportable   08/05/19  00:25    


 


Plt Clumps, EDTA  Not Reportable   08/05/19  00:25    


 


  Not Reportable   08/05/19  00:25    


 


  Not Reportable   08/05/19  00:25    


 


  Not Reportable   08/05/19  00:25    


 


Plt Morphology Comment  Not Reportable   08/05/19  00:25    


 


RBC Morphology  Normal   08/05/19  00:25    


 


Dimorphic RBCs  Not Reportable   08/05/19  00:25    


 


  Not Reportable   08/05/19  00:25    


 


  Not Reportable   08/05/19  00:25    


 


  Not Reportable   08/05/19  00:25    


 


  Not Reportable   08/05/19  00:25    


 


  Not Reportable   08/05/19  00:25    


 


  Not Reportable   08/05/19  00:25    


 


  Not Reportable   08/05/19  00:25    


 


  Not Reportable   08/05/19  00:25    


 


  Not Reportable   08/05/19  00:25    


 


  Not Reportable   08/05/19  00:25    


 


  Not Reportable   08/05/19  00:25    


 


  Not Reportable   08/05/19  00:25    


 


  Not Reportable   08/05/19  00:25    


 


  Not Reportable   08/05/19  00:25    


 


  Not Reportable   08/05/19  00:25    


 


  Not Reportable   08/05/19  00:25    


 


  Not Reportable   08/05/19  00:25    


 


  Not Reportable   08/05/19  00:25    


 


  Not Reportable   08/05/19  00:25    


 


Acanthocytes (Spur)  Not Reportable   08/05/19  00:25    


 


Rouleaux  Not Reportable   08/05/19  00:25    


 


  Not Reportable   08/05/19  00:25    


 


  Not Reportable   08/05/19  00:25    


 


  Not Reportable   08/05/19  00:25    


 


  Not Reportable   08/05/19  00:25    


 


Hem Pathologist Commnt  No   08/05/19  00:25    


 


PT  13.2 Sec. (12.2-14.9)   08/05/19  00:25    


 


INR  1.03  (0.87-1.13)   08/05/19  00:25    


 


APTT  28.0 Sec. (24.2-36.6)   08/05/19  00:25    


 


Sodium  145 mmol/L (137-145)   08/05/19  00:25    


 


Potassium  2.6 mmol/L (3.6-5.0)  L*  08/05/19  00:25    


 


Chloride  104.6 mmol/L ()   08/05/19  00:25    


 


Carbon Dioxide  32 mmol/L (22-30)  H  08/05/19  00:25    


 


  11 mmol/L  08/05/19  00:25    


 


BUN  10 mg/dL (7-17)   08/05/19  00:25    


 


  0.3 mg/dL (0.7-1.2)  L  08/05/19  00:25    


 


Estimated GFR  > 60 ml/min  08/05/19  00:25    


 


  33 %  08/05/19  00:25    


 


Glucose  74 mg/dL ()   08/05/19  00:25    


 


Calcium  7.8 mg/dL (8.4-10.2)  L  08/05/19  00:25    


 


  0.60 mg/dL (0.1-1.2)   08/05/19  00:25    


 


AST  39 units/L (5-40)   08/05/19  00:25    


 


ALT  8 units/L (7-56)   08/05/19  00:25    


 


  93 units/L ()   08/05/19  00:25    


 


  5.9 g/dL (6.3-8.2)  L  08/05/19  00:25    


 


  2.4 g/dL (3.9-5)  L  08/05/19  00:25    


 


  0.7 %  08/05/19  00:25    














Assessment and Plan


Assessment and plan: 


85 year old woman with hypertension, dementia, CAD sent to ER for evaluation of 

vaginal bleed. Agree with GYN consult, discontinue heparin and check serial 

hemoglobin. Check BMP, follow potassium level

## 2019-08-05 NOTE — EMERGENCY DEPARTMENT REPORT
HPI





- General


Chief Complaint: Vaginal Bleeding


Time Seen by Provider: 19 00:05





- HPI


HPI: 





Room 22











The patient is an 85-year-old female presenting with a chief complaint of 

vaginal bleeding.  The patient is currently at an assisted living facility and 

the report for the past 18 hours the patient's had vaginal bleeding.  They state

the patient has been filling up her diapers.  Patient has a history of dementia 

and is unable to provide history.  Patient denies complaints stating she feels 

fine.











Location: [See above]


Duration: [See above]


Quality:  [See above]


Severity:  [See above]


Modifying factors: [see above]


Context: [see above]


Mode of transportation: [not driving]





ED Past Medical Hx





- Past Medical History


Previous Medical History?: Yes


Hx Hypertension: Yes


Hx Dementia: Yes


Additional medical history: cardiac stent x2, DJD neck





- Surgical History


Past Surgical History?: Yes


Hx Cholecystectomy: Yes


Additional Surgical History: bilat knee replacement





- Family History


Family history: no significant





- Social History


Smoking Status: Never Smoker


Substance Use Type: None





- Medications


Home Medications: 


                                Home Medications











 Medication  Instructions  Recorded  Confirmed  Last Taken  Type


 


Alendronate Sodium [Fosamax] 70 mg PO QWEEK 16 1 Day Ago History





    ~17 


 


AtorvaSTATin [Lipitor] 40 mg PO DAILY 16 1 Day Ago History





    ~17 


 


Fenofibrate [Tricor] 145 mg PO QDAY 16 1 Day Ago History





    ~17 


 


Ibuprofen [Motrin 600 MG tab] 600 mg PO Q8H PRN #10 tablet 16 1 

Day Ago Rx





    ~17 


 


Mupirocin [Bactroban 2% OINT] 1 applic TP TID #1 tube 16 1 Day 

Ago Rx





    ~17 


 


Potassium Chloride [Klor-Con 8] 8 meq PO QDAY 16 1 Day Ago 

History





    ~17 


 


Tizanidine HCl [tiZANidine] 2 mg PO DAILY 16 1 Day Ago History





    ~17 


 


amLODIPine [Norvasc] 10 mg PO DAILY 16 1 Day Ago History





    ~17 


 


Sodium Chloride [Saline Nasal 1 - 2 sprays NS PRN PRN #1 bottle 17 1 Day Ago Rx





Spray]    ~17 


 


levoFLOXacin [Levaquin TAB] 500 mg PO QDAY #5 tablet 17 1 Day Ago

Rx





    ~17 


 


Aspirin [Aspirin BABY CHEW TAB] 81 mg PO QDAY #30 tab.chew 17  Unknown Rx


 


Carvedilol [Coreg] 12.5 mg PO BID #60 tablet 17  Unknown Rx


 


Losartan [Cozaar] 50 mg PO QDAY #30 tablet 17  Unknown Rx














ED Review of Systems


ROS: 


Stated complaint: VAGINAL BLEEDING


Other details as noted in HPI





Comment: Unobtainable due to pts medical conditions (dementia)





Physical Exam





- Physical Exam


Vital Signs: 


                                   Vital Signs











  19





  23:59 00:01 00:05


 


Temperature   98.9 F


 


Pulse Rate   74


 


Respiratory   16





Rate   


 


O2 Sat by Pulse 96 95 95





Oximetry   











Physical Exam: 





GENERAL: The patient is well-developed well-nourished female lying on stretcher 

not appearing to be in acute distress. []


HEENT: Normocephalic.  Atraumatic.  Extraocular motions are intact.  Patient has

 moist mucous membranes.


NECK: Supple.  Trachea midline


CHEST/LUNGS: Clear to auscultation.  There is no respiratory distress noted.


HEART/CARDIOVASCULAR: Regular.  There is no tachycardia.  There is no gallop rub

 or murmur.


ABDOMEN: Abdomen is soft, nontender.  Patient has normal bowel sounds.  There is

 no abdominal distention.


SKIN: There is no rash.  There is no edema.  There is no diaphoresis.


NEURO: The patient is awake and alert.  The patient is cooperative.  The patient

 has normal speech


MUSCULOSKELETAL: There is no evidence of acute injury.


PELVIC: Evidence of blood at the introitus of the vaginal vault.  No active 

hemorrhaging visualized externally





ED Course


                                   Vital Signs











  19





  23:59 00:01 00:05


 


Temperature   98.9 F


 


Pulse Rate   74


 


Respiratory   16





Rate   


 


O2 Sat by Pulse 96 95 95





Oximetry   














ED Medical Decision Making





- Lab Data


Result diagrams: 


                                 19 00:25





                                 19 00:25





                                Laboratory Tests











  19





  00:25 00:25 00:25


 


WBC  4.1 L  


 


RBC  3.41 L  


 


Hgb  11.2  


 


Hct  33.2  


 


MCV  98 H  


 


MCH  33 H  


 


MCHC  34  


 


RDW  15.9 H  


 


Plt Count  160  


 


Lymph % (Auto)  Np  


 


Add Manual Diff  Complete  


 


Total Counted  100  


 


Seg Neutrophils %  Np  


 


Seg Neuts % (Manual)  29.0 L  


 


Band Neutrophils %  0  


 


Lymphocytes % (Manual)  60.0 H  


 


Reactive Lymphs % (Man)  0  


 


Monocytes % (Manual)  10.0 H  


 


Eosinophils % (Manual)  0  


 


Basophils % (Manual)  1.0  


 


Metamyelocytes %  0  


 


Myelocytes %  0  


 


Promyelocytes %  0  


 


Blast Cells %  0  


 


Nucleated RBC %  Not Reportable  


 


Seg Neutrophils # Man  1.2 L  


 


Band Neutrophils #  0.0  


 


Lymphocytes # (Manual)  2.5  


 


Abs React Lymphs (Man)  0.0  


 


Monocytes # (Manual)  0.4  


 


Eosinophils # (Manual)  0.0  


 


Basophils # (Manual)  0.0  


 


Metamyelocytes #  0.0  


 


Myelocytes #  0.0  


 


Promyelocytes #  0.0  


 


Blast Cells #  0.0  


 


WBC Morphology  Not Reportable  


 


Hypersegmented Neuts  Not Reportable  


 


Hyposegmented Neuts  Not Reportable  


 


Hypogranular Neuts  Not Reportable  


 


Smudge Cells  Not Reportable  


 


Toxic Granulation  Not Reportable  


 


Toxic Vacuolation  Not Reportable  


 


Dohle Bodies  Not Reportable  


 


Pelger-Huet Anomaly  Not Reportable  


 


Eleonora Rods  Not Reportable  


 


Platelet Estimate  Not Reportable  


 


Clumped Platelets  Not Reportable  


 


Plt Clumps, EDTA  Not Reportable  


 


Large Platelets  Not Reportable  


 


Giant Platelets  Not Reportable  


 


Platelet Satelliting  Not Reportable  


 


Plt Morphology Comment  Not Reportable  


 


RBC Morphology  Normal  


 


Dimorphic RBCs  Not Reportable  


 


Polychromasia  Not Reportable  


 


Hypochromasia  Not Reportable  


 


Poikilocytosis  Not Reportable  


 


Anisocytosis  Not Reportable  


 


Microcytosis  Not Reportable  


 


Macrocytosis  Not Reportable  


 


Spherocytes  Not Reportable  


 


Pappenheimer Bodies  Not Reportable  


 


Sickle Cells  Not Reportable  


 


Target Cells  Not Reportable  


 


Tear Drop Cells  Not Reportable  


 


Ovalocytes  Not Reportable  


 


Helmet Cells  Not Reportable  


 


[place] Bodies  Not Reportable  


 


Cabot Rings  Not Reportable  


 


[place] Cells  Not Reportable  


 


Bite Cells  Not Reportable  


 


Crenated Cell  Not Reportable  


 


Elliptocytes  Not Reportable  


 


Acanthocytes (Spur)  Not Reportable  


 


Rouleaux  Not Reportable  


 


Hemoglobin C Crystals  Not Reportable  


 


Schistocytes  Not Reportable  


 


Malaria parasites  Not Reportable  


 


Stevie Bodies  Not Reportable  


 


Hem Pathologist Commnt  No  


 


PT   13.2 


 


INR   1.03 


 


APTT   28.0 


 


Sodium    145


 


Potassium    2.6 L*


 


Chloride    104.6


 


Carbon Dioxide    32 H


 


Anion Gap    11


 


BUN    10


 


Creatinine    0.3 L


 


Estimated GFR    > 60


 


BUN/Creatinine Ratio    33


 


Glucose    74


 


Calcium    7.8 L


 


Total Bilirubin    0.60


 


AST    39


 


ALT    8


 


Alkaline Phosphatase    93


 


Total Protein    5.9 L


 


Albumin    2.4 L


 


Albumin/Globulin Ratio    0.7














- EKG Data


-: EKG Interpreted by Me


EKG shows normal: sinus rhythm


Rate: normal





- EKG Data


When compared to previous EKG there are: no significant change


Interpretation: other (occasional PAC)





- Radiology Data


Radiology results: report reviewed (pelvic ultrasound), image reviewed (pelvic 

ultrasound)





[facility] [address] [place] [address] 

Ultrasound Report Signed Patient: PRASHANTH TAVARES MR#: [id number] : 

1934 Acct:[id number] Age/Sex: 85 / F ADM Date: 19 Loc: ED 

Attending Dr: Ordering Physician: FACUNDO AVITIA MD Date of Service: 19 

Procedure(s): US pelvic complete Accession Number(s): [id number] cc: FACUNDO AVITIA MD Transabdominal pelvic ultrasound INDICATION: Abnormal bleeding FINDINGS: 

Uterus measures 9.4 x 5.6 x 4.2 cm. There is a heterogeneous mass in the uterine

 fundus containing calcifications measuring 4.3 x 3.6 x 3.3 cm. Neither ovary is

 seen. No free fluid identified. IMPRESSION: The uterine mass does not appear 

typical for a fibroid and malignancy would need to be considered. Signer Name: 

Galo Martin MD Signed: 2019 1:27 AM Workstation Name: [id number] 

Transcribed By: SHA Dictated By: Galo Martin MD Electronically 

Authenticated By: Galo Martin MD Signed Date/Time: 19 DD/DT: 

19 TD/TT: 





- Differential Diagnosis


postmenopausal vaginal bleeding


Critical care attestation.: 


If time is entered above; I have spent that time in minutes in the direct care 

of this critically ill patient, excluding procedure time.








ED Disposition


Clinical Impression: 


 Post-menopausal bleeding, Hypokalemia, Uterine mass





Disposition:  OP ADMIT IP TO THIS HOSP


Is pt being admited?: Yes


Does the pt Need Aspirin: No


Condition: Fair


Referrals: 


PRIMARY CARE,MD [Primary Care Provider] - 3-5 Days


Time of Disposition: 02:35 (hospitalist paged (Dr. Chloe Wallace))

## 2019-08-06 LAB
ANISOCYTOSIS BLD QL SMEAR: (no result)
BAND NEUTROPHILS # (MANUAL): 0 K/MM3
BILIRUB UR QL STRIP: (no result)
BLOOD UR QL VISUAL: (no result)
BUN SERPL-MCNC: 6 MG/DL (ref 7–17)
BUN/CREAT SERPL: 20 %
CALCIUM SERPL-MCNC: 7.7 MG/DL (ref 8.4–10.2)
HCT VFR BLD CALC: 33.1 % (ref 30.3–42.9)
HEMOLYSIS INDEX: 27
HGB BLD-MCNC: 11.4 GM/DL (ref 10.1–14.3)
MCHC RBC AUTO-ENTMCNC: 35 % (ref 30–34)
MCV RBC AUTO: 98 FL (ref 79–97)
MYELOCYTES # (MANUAL): 0 K/MM3
PH UR STRIP: 8 [PH] (ref 5–7)
PLATELET # BLD: 185 K/MM3 (ref 140–440)
PROMYELOCYTES # (MANUAL): 0 K/MM3
PROT UR STRIP-MCNC: (no result) MG/DL
RBC # BLD AUTO: 3.38 M/MM3 (ref 3.65–5.03)
RBC #/AREA URNS HPF: 1 /HPF (ref 0–6)
TOTAL CELLS COUNTED BLD: 100
UROBILINOGEN UR-MCNC: < 2 MG/DL (ref ?–2)
WBC #/AREA URNS HPF: 1 /HPF (ref 0–6)

## 2019-08-06 RX ADMIN — AMLODIPINE BESYLATE SCH MG: 10 TABLET ORAL at 10:32

## 2019-08-06 RX ADMIN — CARVEDILOL SCH MG: 12.5 TABLET, FILM COATED ORAL at 21:51

## 2019-08-06 RX ADMIN — HALOPERIDOL LACTATE SCH MG: 5 INJECTION, SOLUTION INTRAMUSCULAR at 16:36

## 2019-08-06 RX ADMIN — Medication SCH ML: at 21:51

## 2019-08-06 RX ADMIN — CARVEDILOL SCH: 12.5 TABLET, FILM COATED ORAL at 11:00

## 2019-08-06 RX ADMIN — DOCUSATE SODIUM SCH: 100 CAPSULE, LIQUID FILLED ORAL at 13:59

## 2019-08-06 RX ADMIN — CARVEDILOL SCH MG: 12.5 TABLET, FILM COATED ORAL at 10:31

## 2019-08-06 RX ADMIN — DOCUSATE SODIUM SCH MG: 100 CAPSULE, LIQUID FILLED ORAL at 21:51

## 2019-08-06 RX ADMIN — HALOPERIDOL LACTATE SCH MG: 5 INJECTION, SOLUTION INTRAMUSCULAR at 19:57

## 2019-08-06 RX ADMIN — Medication SCH ML: at 10:33

## 2019-08-06 NOTE — PROGRESS NOTE
Assessment and Plan


Assessment and plan: 





Hypokalemia


-Potassium replete as needed


-Continue to monitor electrolytes





Postmenopausal vaginal bleeding


-Evidence of blood at the introitus of the vaginal vault


-Secondary to uterine mass


-Await GYN consult





Uterine mass


-Heterogeneous mass in the uterine fundus containing calcifications measuring 4

.3 x 3.6 x 3.3 cm. 


-GYN consulted





Moderate to severe malnutrition


-Albumin on admission 2.6


-Encourage oral intake


-Dietitian consult pending





HTN


-Monitor BP


-Resume home antihypertensive meds to optimize BP, once medication 

reconciliation has been completed


-IV hydralazine when necessary





CAD


-S/p stent x2


-Echo 6/8/17 showed: EF 55-60%, impaired relaxation, mild MR, mild TR, RVSP 

48mmHg, mild pulmonary HTN





DVT PPX


-on SCD's


-on Heparin





History of dementia








History


Interval history: 





Patient is a 85-year-old  woman who lives in an assisted living 

facility under [facility] with history of CAD s/p stent x2, HTN, dementia 

since [facility] ED via EMS with complaints of postmenopausal vaginal bleeding.  

Patient currently refusing medications at morning.





Hospitalist Physical





- Constitutional


Vitals: 


                                        











Temp Pulse Resp BP Pulse Ox


 


 97.0 F L  76   18   167/77   96 


 


 08/06/19 07:52  08/06/19 10:32  08/06/19 10:00  08/06/19 10:32  08/06/19 04:55











General appearance: Present: no acute distress, well-nourished





- EENT


Eyes: Present: PERRL, EOM intact


ENT: hearing intact, clear oral mucosa, dentition normal





- Neck


Neck: Present: supple, normal ROM





- Respiratory


Respiratory effort: normal


Respiratory: bilateral: CTA





- Cardiovascular


Rhythm: regular


Heart Sounds: Present: S1 & S2.  Absent: gallop, rub





- Extremities


Extremities: no ischemia, No edema, Full ROM





- Abdominal


General gastrointestinal: soft, non-tender, non-distended, normal bowel sounds





- Integumentary


Integumentary: Present: clear, warm, dry





- Neurologic


Neurologic: CNII-XII intact, moves all extremities





Results





- Labs


CBC & Chem 7: 


                                 08/06/19 04:29





                                 08/06/19 04:29


Labs: 


                             Laboratory Last Values











WBC  4.9 K/mm3 (4.5-11.0)   08/06/19  04:29    


 


RBC  3.38 M/mm3 (3.65-5.03)  L  08/06/19  04:29    


 


Hgb  11.4 gm/dl (10.1-14.3)   08/06/19  04:29    


 


Hct  33.1 % (30.3-42.9)   08/06/19  04:29    


 


MCV  98 fl (79-97)  H  08/06/19  04:29    


 


MCH  34 pg (28-32)  H  08/06/19  04:29    


 


MCHC  35 % (30-34)  H  08/06/19  04:29    


 


RDW  16.2 % (13.2-15.2)  H  08/06/19  04:29    


 


Plt Count  185 K/mm3 (140-440)   08/06/19  04:29    


 


Lymph % (Auto)  Np   08/06/19  04:29    


 


Add Manual Diff  Complete   08/06/19  04:29    


 


Total Counted  100   08/06/19  04:29    


 


Seg Neutrophils %  Np   08/06/19  04:29    


 


Seg Neuts % (Manual)  38.0 % (40.0-70.0)  L  08/06/19  04:29    


 


  0 %  08/06/19  04:29    


 


  58.0 % (13.4-35.0)  H  08/06/19  04:29    


 


Reactive Lymphs % (Man)  0 %  08/06/19  04:29    


 


  4.0 % (0.0-7.3)   08/06/19  04:29    


 


  0 % (0.0-4.3)   08/06/19  04:29    


 


  0 % (0.0-1.8)   08/06/19  04:29    


 


  0 %  08/06/19  04:29    


 


  0 %  08/06/19  04:29    


 


  0 %  08/06/19  04:29    


 


  0 %  08/06/19  04:29    


 


Nucleated RBC %  Not Reportable   08/06/19  04:29    


 


Seg Neutrophils # Man  1.9 K/mm3 (1.8-7.7)   08/06/19  04:29    


 


Band Neutrophils #  0.0 K/mm3  08/06/19  04:29    


 


  2.8 K/mm3 (1.2-5.4)   08/06/19  04:29    


 


Abs React Lymphs (Man)  0.0 K/mm3  08/06/19  04:29    


 


  0.2 K/mm3 (0.0-0.8)   08/06/19  04:29    


 


  0.0 K/mm3 (0.0-0.4)   08/06/19  04:29    


 


  0.0 K/mm3 (0.0-0.1)   08/06/19  04:29    


 


  0.0 K/mm3  08/06/19  04:29    


 


  0.0 K/mm3  08/06/19  04:29    


 


  0.0 K/mm3  08/06/19  04:29    


 


Blast Cells #  0.0 K/mm3  08/06/19  04:29    


 


WBC Morphology  Not Reportable   08/06/19  04:29    


 


Hypersegmented Neuts  Not Reportable   08/06/19  04:29    


 


Hyposegmented Neuts  Not Reportable   08/06/19  04:29    


 


Hypogranular Neuts  Not Reportable   08/06/19  04:29    


 


  Not Reportable   08/06/19  04:29    


 


  Not Reportable   08/06/19  04:29    


 


  Not Reportable   08/06/19  04:29    


 


  Not Reportable   08/06/19  04:29    


 


  Not Reportable   08/06/19  04:29    


 


  Not Reportable   08/06/19  04:29    


 


  Consistent w auto   08/06/19  04:29    


 


  Not Reportable   08/06/19  04:29    


 


Plt Clumps, EDTA  Not Reportable   08/06/19  04:29    


 


  Not Reportable   08/06/19  04:29    


 


  Not Reportable   08/06/19  04:29    


 


  Not Reportable   08/06/19  04:29    


 


Plt Morphology Comment  Not Reportable   08/06/19  04:29    


 


RBC Morphology  Not Reportable   08/06/19  04:29    


 


Dimorphic RBCs  Not Reportable   08/06/19  04:29    


 


  Not Reportable   08/06/19  04:29    


 


  Not Reportable   08/06/19  04:29    


 


  Few   08/06/19  04:29    


 


  1+   08/06/19  04:29    


 


  Not Reportable   08/06/19  04:29    


 


  Not Reportable   08/06/19  04:29    


 


  Not Reportable   08/06/19  04:29    


 


  Not Reportable   08/06/19  04:29    


 


  Not Reportable   08/06/19  04:29    


 


  Not Reportable   08/06/19  04:29    


 


  Not Reportable   08/06/19  04:29    


 


  Not Reportable   08/06/19  04:29    


 


  Not Reportable   08/06/19  04:29    


 


  Not Reportable   08/06/19  04:29    


 


  Not Reportable   08/06/19  04:29    


 


  Not Reportable   08/06/19  04:29    


 


  Not Reportable   08/06/19  04:29    


 


  Not Reportable   08/06/19  04:29    


 


  Rare   08/06/19  04:29    


 


Acanthocytes (Spur)  Not Reportable   08/06/19  04:29    


 


Rouleaux  Not Reportable   08/06/19  04:29    


 


  Not Reportable   08/06/19  04:29    


 


  Not Reportable   08/06/19  04:29    


 


  Not Reportable   08/06/19  04:29    


 


  Not Reportable   08/06/19  04:29    


 


Hem Pathologist Commnt  No   08/06/19  04:29    


 


PT  13.2 Sec. (12.2-14.9)   08/05/19  00:25    


 


INR  1.03  (0.87-1.13)   08/05/19  00:25    


 


APTT  28.0 Sec. (24.2-36.6)   08/05/19  00:25    


 


Sodium  140 mmol/L (137-145)   08/06/19  04:29    


 


Potassium  3.4 mmol/L (3.6-5.0)  L  08/06/19  04:29    


 


Chloride  107.5 mmol/L ()  H  08/06/19  04:29    


 


Carbon Dioxide  26 mmol/L (22-30)   08/06/19  04:29    


 


  10 mmol/L  08/06/19  04:29    


 


BUN  6 mg/dL (7-17)  L  08/06/19  04:29    


 


  0.3 mg/dL (0.7-1.2)  L  08/06/19  04:29    


 


Estimated GFR  > 60 ml/min  08/06/19  04:29    


 


  20 %  08/06/19  04:29    


 


Glucose  80 mg/dL ()   08/06/19  04:29    


 


Calcium  7.7 mg/dL (8.4-10.2)  L  08/06/19  04:29    


 


  0.60 mg/dL (0.1-1.2)   08/05/19  00:25    


 


AST  39 units/L (5-40)   08/05/19  00:25    


 


ALT  8 units/L (7-56)   08/05/19  00:25    


 


  93 units/L ()   08/05/19  00:25    


 


  5.9 g/dL (6.3-8.2)  L  08/05/19  00:25    


 


  2.4 g/dL (3.9-5)  L  08/05/19  00:25    


 


  0.7 %  08/05/19  00:25    














Active Medications





- Current Medications


Current Medications: 














Generic Name Dose Route Start Last Admin





  Trade Name Freq  PRN Reason Stop Dose Admin


 


Acetaminophen  650 mg  08/05/19 03:05  08/05/19 14:28





  Tylenol  PO   650 mg





  Q4H PRN   Administration





  Pain MILD(1-3)/Fever >100.5/HA  


 


Amlodipine Besylate  10 mg  08/05/19 10:00  08/06/19 10:32





  Norvasc  PO   10 mg





  QDAY REY   Administration


 


Atorvastatin Calcium  40 mg  08/05/19 10:00  08/06/19 10:32





  Lipitor  PO   40 mg





  DAILY REY   Administration


 


Carvedilol  12.5 mg  08/05/19 10:00  08/06/19 10:31





  Coreg  PO   12.5 mg





  BID REY   Administration


 


Docusate Sodium  100 mg  08/05/19 10:00  08/05/19 21:47





  Colace  PO   Not Given





  BID REY  


 


Hydralazine HCl  10 mg  08/05/19 03:09 





  Apresoline  IV  





  Q4HR PRN  





  Blood Pressure  


 


Sodium Chloride  1,000 mls @ 75 mls/hr  08/05/19 04:00  08/05/19 20:39





  Nacl 0.9% 1000 Ml  IV   75 mls/hr





  AS DIRECT REY   Administration


 


Ondansetron HCl  4 mg  08/05/19 03:05 





  Zofran  IV  





  Q8H PRN  





  Nausea And Vomiting  


 


Sodium Chloride  10 ml  08/05/19 10:00  08/06/19 10:33





  Sodium Chloride Flush Syringe 10 Ml  IV   10 ml





  BID REY   Administration


 


Sodium Chloride  10 ml  08/05/19 03:05 





  Sodium Chloride Flush Syringe 10 Ml  IV  





  PRN PRN  





  LINE FLUSH

## 2019-08-07 VITALS — SYSTOLIC BLOOD PRESSURE: 155 MMHG | DIASTOLIC BLOOD PRESSURE: 63 MMHG

## 2019-08-07 RX ADMIN — HALOPERIDOL LACTATE SCH: 5 INJECTION, SOLUTION INTRAMUSCULAR at 08:30

## 2019-08-07 RX ADMIN — HALOPERIDOL LACTATE SCH: 5 INJECTION, SOLUTION INTRAMUSCULAR at 18:08

## 2019-08-07 RX ADMIN — DOCUSATE SODIUM SCH MG: 100 CAPSULE, LIQUID FILLED ORAL at 09:45

## 2019-08-07 RX ADMIN — HALOPERIDOL LACTATE SCH MG: 5 INJECTION, SOLUTION INTRAMUSCULAR at 00:33

## 2019-08-07 RX ADMIN — CARVEDILOL SCH MG: 12.5 TABLET, FILM COATED ORAL at 09:45

## 2019-08-07 RX ADMIN — AMLODIPINE BESYLATE SCH MG: 10 TABLET ORAL at 09:45

## 2019-08-07 RX ADMIN — HALOPERIDOL LACTATE SCH: 5 INJECTION, SOLUTION INTRAMUSCULAR at 04:10

## 2019-08-07 RX ADMIN — Medication SCH ML: at 12:18

## 2019-08-07 RX ADMIN — HALOPERIDOL LACTATE SCH: 5 INJECTION, SOLUTION INTRAMUSCULAR at 12:20

## 2019-08-07 NOTE — CONSULTATION
History of Present Illness


Consult date: 08/07/19


Reason for consult: other (postmenopausal bleeding)


History of present illness: 





Pt is an 85 year old confused white female who is unable to provide any history,

but is reported to have had vaginal bleeding at her living facility for 

approximately 18 hours. An ultrasound on admission showed an old calcified 

fibroid, but the endometrial stripe measurement was not reported.





Past History


Past Medical History: heart disease, hypertension, other (demetia)


Social history: other (lives in nursing facilty)





Medications and Allergies


                                    Allergies











Allergy/AdvReac Type Severity Reaction Status Date / Time


 


No Known Allergies Allergy   Unverified 08/30/16 09:30











                                Home Medications











 Medication  Instructions  Recorded  Confirmed  Last Taken  Type


 


ALPRAZolam 0.5 mg PO Q4H PRN 08/05/19 08/05/19 Unknown History


 


Acetaminophen 650 mg PO Q4H PRN 08/05/19 08/05/19 Unknown History


 


LORazepam 1 mg PO Q4H PRN 08/05/19 08/05/19 Unknown History


 


Ondansetron 4 mg PO Q4H PRN 08/05/19 08/05/19 Unknown History


 


SEROquel 100 mg PO BID 08/05/19 08/05/19 Unknown History











Active Meds: 


Active Medications





Acetaminophen (Tylenol)  650 mg PO Q4H PRN


   PRN Reason: Pain MILD(1-3)/Fever >100.5/HA


   Last Admin: 08/05/19 14:28 Dose:  650 mg


   Documented by: 


Amlodipine Besylate (Norvasc)  10 mg PO QDAY [facility]


   Last Admin: 08/07/19 09:45 Dose:  10 mg


   Documented by: 


Atorvastatin Calcium (Lipitor)  40 mg PO DAILY [facility]


   Last Admin: 08/07/19 09:45 Dose:  40 mg


   Documented by: 


Carvedilol (Coreg)  12.5 mg PO BID [facility]


   Last Admin: 08/07/19 09:45 Dose:  12.5 mg


   Documented by: 


Docusate Sodium (Colace)  100 mg PO BID [facility]


   Last Admin: 08/07/19 09:45 Dose:  100 mg


   Documented by: 


Haloperidol Lactate (Haldol)  2 mg IM Q4H [facility]


   Last Admin: 08/07/19 12:20 Dose:  Not Given


   Documented by: 


Hydralazine HCl (Apresoline)  10 mg IV Q4HR PRN


   PRN Reason: Blood Pressure


   Last Admin: 08/06/19 17:57 Dose:  10 mg


   Documented by: 


Sodium Chloride (Nacl 0.9% 1000 Ml)  1,000 mls @ 75 mls/hr IV AS DIRECT REY


   Last Admin: 08/05/19 20:39 Dose:  75 mls/hr


   Documented by: 


Ondansetron HCl (Zofran)  4 mg IV Q8H PRN


   PRN Reason: Nausea And Vomiting


Sodium Chloride (Sodium Chloride Flush Syringe 10 Ml)  10 ml IV BID REY


   Last Admin: 08/07/19 12:18 Dose:  10 ml


   Documented by: 


Sodium Chloride (Sodium Chloride Flush Syringe 10 Ml)  10 ml IV PRN PRN


   PRN Reason: LINE FLUSH











Review of Systems


ROS unobtainable: due to mental status





- Vital Signs


Vital signs: 


                                   Vital Signs











Pulse Ox


 


 96 


 


 08/04/19 23:59








                                        











Temp Pulse Resp BP Pulse Ox


 


 97.8 F   68   18   164/83   94 


 


 08/07/19 08:15  08/07/19 10:00  08/07/19 10:00  08/07/19 09:45  08/07/19 03:35














- Physical Exam


Genitourinary (Female): Positive: normal external genitalia, normal perenium


Vulva: both: normal


Vagina: Positive: atrophic mucosa (no bleeding noted), other


Uterus: Positive: normal size


Anus/Rectum: Positive: normal perianal skin


Extremities: Positive: normal





Results


Result Diagrams: 


                                 08/06/19 04:29





                                 08/06/19 04:29


                              Abnormal lab results











  08/06/19 Range/Units





  14:50 


 


Urine pH  8.0 H  (5.0-7.0)  








All other labs normal.








Assessment and Plan





85 year old female here with reported postmenopausal bleeding, however, she has 

had no bleeding in over 24 hours while inpatient. There is no evidence of old or

fresh blood anywhere on the patient and patient is unable to provide any history

at all. 





Observe at home for now and follow up as outpatient if tolerated by patient.

## 2019-08-07 NOTE — PROGRESS NOTE
Assessment and Plan


Assessment and plan: 





Hypokalemia


-Potassium replete as needed


-Continue to monitor electrolytes





Postmenopausal vaginal bleeding


-Evidence of blood at the introitus of the vaginal vault


-Secondary to uterine mass


-Await GYN consult





Uterine mass


-Heterogeneous mass in the uterine fundus containing calcifications measuring 4

.3 x 3.6 x 3.3 cm. 


-GYN consulted





Moderate to severe malnutrition


-Albumin on admission 2.6


-Encourage oral intake


-Dietitian consult pending





HTN


-Monitor BP


-Resume home antihypertensive meds to optimize BP, once medication 

reconciliation has been completed


-IV hydralazine when necessary





CAD


-S/p stent x2


-Echo 6/8/17 showed: EF 55-60%, impaired relaxation, mild MR, mild TR, RVSP 

48mmHg, mild pulmonary HTN





DVT PPX


-on SCD's


-on Heparin





History of dementia








History


Interval history: 





Patient is a 85-year-old  woman who lives in an assisted living 

facility under [facility] with history of CAD s/p stent x2, HTN, dementia 

since [facility] ED via EMS with complaints of postmenopausal vaginal bleeding.  

Patient more calm today





Hospitalist Physical





- Constitutional


Vitals: 


                                        











Temp Pulse Resp BP Pulse Ox


 


 97.8 F   68   18   164/83   94 


 


 08/07/19 08:15  08/07/19 10:00  08/07/19 10:00  08/07/19 09:45  08/07/19 03:35











General appearance: Present: no acute distress, well-nourished





- EENT


Eyes: Present: PERRL, EOM intact


ENT: hearing intact, clear oral mucosa, dentition normal





- Neck


Neck: Present: supple, normal ROM





- Respiratory


Respiratory effort: normal


Respiratory: bilateral: CTA





- Cardiovascular


Rhythm: regular


Heart Sounds: Present: S1 & S2.  Absent: gallop, rub





- Extremities


Extremities: no ischemia, No edema, Full ROM





- Abdominal


General gastrointestinal: soft, non-tender, non-distended, normal bowel sounds





- Integumentary


Integumentary: Present: clear, warm, dry





- Neurologic


Neurologic: CNII-XII intact, moves all extremities





Results





- Labs


CBC & Chem 7: 


                                 08/06/19 04:29





                                 08/06/19 04:29


Labs: 


                             Laboratory Last Values











WBC  4.9 K/mm3 (4.5-11.0)   08/06/19  04:29    


 


RBC  3.38 M/mm3 (3.65-5.03)  L  08/06/19  04:29    


 


Hgb  11.4 gm/dl (10.1-14.3)   08/06/19  04:29    


 


Hct  33.1 % (30.3-42.9)   08/06/19  04:29    


 


MCV  98 fl (79-97)  H  08/06/19  04:29    


 


MCH  34 pg (28-32)  H  08/06/19  04:29    


 


MCHC  35 % (30-34)  H  08/06/19  04:29    


 


RDW  16.2 % (13.2-15.2)  H  08/06/19  04:29    


 


Plt Count  185 K/mm3 (140-440)   08/06/19  04:29    


 


Lymph % (Auto)  Np   08/06/19  04:29    


 


Add Manual Diff  Complete   08/06/19  04:29    


 


Total Counted  100   08/06/19  04:29    


 


Seg Neutrophils %  Np   08/06/19  04:29    


 


Seg Neuts % (Manual)  38.0 % (40.0-70.0)  L  08/06/19  04:29    


 


  0 %  08/06/19  04:29    


 


  58.0 % (13.4-35.0)  H  08/06/19  04:29    


 


Reactive Lymphs % (Man)  0 %  08/06/19  04:29    


 


  4.0 % (0.0-7.3)   08/06/19  04:29    


 


  0 % (0.0-4.3)   08/06/19  04:29    


 


  0 % (0.0-1.8)   08/06/19  04:29    


 


  0 %  08/06/19  04:29    


 


  0 %  08/06/19  04:29    


 


  0 %  08/06/19  04:29    


 


  0 %  08/06/19  04:29    


 


Nucleated RBC %  Not Reportable   08/06/19  04:29    


 


Seg Neutrophils # Man  1.9 K/mm3 (1.8-7.7)   08/06/19  04:29    


 


Band Neutrophils #  0.0 K/mm3  08/06/19  04:29    


 


  2.8 K/mm3 (1.2-5.4)   08/06/19  04:29    


 


Abs React Lymphs (Man)  0.0 K/mm3  08/06/19  04:29    


 


  0.2 K/mm3 (0.0-0.8)   08/06/19  04:29    


 


  0.0 K/mm3 (0.0-0.4)   08/06/19  04:29    


 


  0.0 K/mm3 (0.0-0.1)   08/06/19  04:29    


 


  0.0 K/mm3  08/06/19  04:29    


 


  0.0 K/mm3  08/06/19  04:29    


 


  0.0 K/mm3  08/06/19  04:29    


 


Blast Cells #  0.0 K/mm3  08/06/19  04:29    


 


WBC Morphology  Not Reportable   08/06/19  04:29    


 


Hypersegmented Neuts  Not Reportable   08/06/19  04:29    


 


Hyposegmented Neuts  Not Reportable   08/06/19  04:29    


 


Hypogranular Neuts  Not Reportable   08/06/19  04:29    


 


  Not Reportable   08/06/19  04:29    


 


  Not Reportable   08/06/19  04:29    


 


  Not Reportable   08/06/19  04:29    


 


  Not Reportable   08/06/19  04:29    


 


  Not Reportable   08/06/19  04:29    


 


  Not Reportable   08/06/19  04:29    


 


  Consistent w auto   08/06/19  04:29    


 


  Not Reportable   08/06/19  04:29    


 


Plt Clumps, EDTA  Not Reportable   08/06/19  04:29    


 


  Not Reportable   08/06/19  04:29    


 


  Not Reportable   08/06/19  04:29    


 


  Not Reportable   08/06/19  04:29    


 


Plt Morphology Comment  Not Reportable   08/06/19  04:29    


 


RBC Morphology  Not Reportable   08/06/19  04:29    


 


Dimorphic RBCs  Not Reportable   08/06/19  04:29    


 


  Not Reportable   08/06/19  04:29    


 


  Not Reportable   08/06/19  04:29    


 


  Few   08/06/19  04:29    


 


  1+   08/06/19  04:29    


 


  Not Reportable   08/06/19  04:29    


 


  Not Reportable   08/06/19  04:29    


 


  Not Reportable   08/06/19  04:29    


 


  Not Reportable   08/06/19  04:29    


 


  Not Reportable   08/06/19  04:29    


 


  Not Reportable   08/06/19  04:29    


 


  Not Reportable   08/06/19  04:29    


 


  Not Reportable   08/06/19  04:29    


 


  Not Reportable   08/06/19  04:29    


 


  Not Reportable   08/06/19  04:29    


 


  Not Reportable   08/06/19  04:29    


 


  Not Reportable   08/06/19  04:29    


 


  Not Reportable   08/06/19  04:29    


 


  Not Reportable   08/06/19  04:29    


 


  Rare   08/06/19  04:29    


 


Acanthocytes (Spur)  Not Reportable   08/06/19  04:29    


 


Rouleaux  Not Reportable   08/06/19  04:29    


 


  Not Reportable   08/06/19  04:29    


 


  Not Reportable   08/06/19  04:29    


 


  Not Reportable   08/06/19  04:29    


 


  Not Reportable   08/06/19  04:29    


 


Hem Pathologist Commnt  No   08/06/19  04:29    


 


PT  13.2 Sec. (12.2-14.9)   08/05/19  00:25    


 


INR  1.03  (0.87-1.13)   08/05/19  00:25    


 


APTT  28.0 Sec. (24.2-36.6)   08/05/19  00:25    


 


Sodium  140 mmol/L (137-145)   08/06/19  04:29    


 


Potassium  3.4 mmol/L (3.6-5.0)  L  08/06/19  04:29    


 


Chloride  107.5 mmol/L ()  H  08/06/19  04:29    


 


Carbon Dioxide  26 mmol/L (22-30)   08/06/19  04:29    


 


  10 mmol/L  08/06/19  04:29    


 


BUN  6 mg/dL (7-17)  L  08/06/19  04:29    


 


  0.3 mg/dL (0.7-1.2)  L  08/06/19  04:29    


 


Estimated GFR  > 60 ml/min  08/06/19  04:29    


 


  20 %  08/06/19  04:29    


 


Glucose  80 mg/dL ()   08/06/19  04:29    


 


Calcium  7.7 mg/dL (8.4-10.2)  L  08/06/19  04:29    


 


  0.60 mg/dL (0.1-1.2)   08/05/19  00:25    


 


AST  39 units/L (5-40)   08/05/19  00:25    


 


ALT  8 units/L (7-56)   08/05/19  00:25    


 


  93 units/L ()   08/05/19  00:25    


 


  5.9 g/dL (6.3-8.2)  L  08/05/19  00:25    


 


  2.4 g/dL (3.9-5)  L  08/05/19  00:25    


 


  0.7 %  08/05/19  00:25    


 


  Colorless  (Yellow)   08/06/19  14:50    


 


  Clear  (Clear)   08/06/19  14:50    


 


  8.0  (5.0-7.0)  H  08/06/19  14:50    


 


Ur Specific Gravity  1.003  (1.003-1.030)   08/06/19  14:50    


 


  <15 mg/dl mg/dL (Negative)   08/06/19  14:50    


 


  Neg mg/dL (Negative)   08/06/19  14:50    


 


  Neg mg/dL (Negative)   08/06/19  14:50    


 


  Sm  (Negative)   08/06/19  14:50    


 


  Neg  (Negative)   08/06/19  14:50    


 


  Neg  (Negative)   08/06/19  14:50    


 


  < 2.0 mg/dL (<2.0)   08/06/19  14:50    


 


Ur Leukocyte Esterase  Neg  (Negative)   08/06/19  14:50    


 


  1.0 /HPF (0.0-6.0)   08/06/19  14:50    


 


  1.0 /HPF (0.0-6.0)   08/06/19  14:50    














Active Medications





- Current Medications


Current Medications: 














Generic Name Dose Route Start Last Admin





  Trade Name Freq  PRN Reason Stop Dose Admin


 


Acetaminophen  650 mg  08/05/19 03:05  08/05/19 14:28





  Tylenol  PO   650 mg





  Q4H PRN   Administration





  Pain MILD(1-3)/Fever >100.5/HA  


 


Amlodipine Besylate  10 mg  08/05/19 10:00  08/07/19 09:45





  Norvasc  PO   10 mg





  QDAY REY   Administration


 


Atorvastatin Calcium  40 mg  08/05/19 10:00  08/07/19 09:45





  Lipitor  PO   40 mg





  DAILY REY   Administration


 


Carvedilol  12.5 mg  08/05/19 10:00  08/07/19 09:45





  Coreg  PO   12.5 mg





  BID REY   Administration


 


Docusate Sodium  100 mg  08/05/19 10:00  08/07/19 09:45





  Colace  PO   100 mg





  BID REY   Administration


 


Haloperidol Lactate  2 mg  08/06/19 16:00  08/07/19 12:20





  Haldol  IM   Not Given





  Q4H REY  


 


Hydralazine HCl  10 mg  08/05/19 03:09  08/06/19 17:57





  Apresoline  IV   10 mg





  Q4HR PRN   Administration





  Blood Pressure  


 


Sodium Chloride  1,000 mls @ 75 mls/hr  08/05/19 04:00  08/05/19 20:39





  Nacl 0.9% 1000 Ml  IV   75 mls/hr





  AS DIRECT REY   Administration


 


Ondansetron HCl  4 mg  08/05/19 03:05 





  Zofran  IV  





  Q8H PRN  





  Nausea And Vomiting  


 


Sodium Chloride  10 ml  08/05/19 10:00  08/07/19 12:18





  Sodium Chloride Flush Syringe 10 Ml  IV   10 ml





  BID REY   Administration


 


Sodium Chloride  10 ml  08/05/19 03:05 





  Sodium Chloride Flush Syringe 10 Ml  IV  





  PRN PRN  





  LINE FLUSH

## 2019-08-17 ENCOUNTER — HOSPITAL ENCOUNTER (EMERGENCY)
Dept: HOSPITAL 5 - ED | Age: 84
LOS: 1 days | Discharge: OTHER INSTITUTION | End: 2019-08-18
Payer: MEDICARE

## 2019-08-17 DIAGNOSIS — E78.00: ICD-10-CM

## 2019-08-17 DIAGNOSIS — M19.90: ICD-10-CM
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
## 2019-08-17 DIAGNOSIS — I11.0: ICD-10-CM
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
## 2019-08-17 DIAGNOSIS — Z95.1: ICD-10-CM
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
## 2019-08-17 DIAGNOSIS — Z96.653: ICD-10-CM

## 2019-08-17 DIAGNOSIS — S61.012A: Primary | ICD-10-CM

## 2019-08-17 DIAGNOSIS — M25.562: ICD-10-CM
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
## 2019-08-17 DIAGNOSIS — Z79.899: ICD-10-CM

## 2019-08-17 DIAGNOSIS — M25.561: ICD-10-CM

## 2019-08-17 DIAGNOSIS — Z90.49: ICD-10-CM

## 2019-08-17 DIAGNOSIS — I50.9: ICD-10-CM

## 2019-08-17 PROCEDURE — 81001 URINALYSIS AUTO W/SCOPE: CPT

## 2019-08-17 PROCEDURE — 87086 URINE CULTURE/COLONY COUNT: CPT

## 2019-08-17 PROCEDURE — 70450 CT HEAD/BRAIN W/O DYE: CPT

## 2019-08-17 PROCEDURE — 87076 CULTURE ANAEROBE IDENT EACH: CPT

## 2019-08-17 PROCEDURE — 90471 IMMUNIZATION ADMIN: CPT

## 2019-08-17 PROCEDURE — 90715 TDAP VACCINE 7 YRS/> IM: CPT

## 2019-08-17 PROCEDURE — 72170 X-RAY EXAM OF PELVIS: CPT

## 2019-08-17 PROCEDURE — 87186 SC STD MICRODIL/AGAR DIL: CPT

## 2019-08-18 VITALS — SYSTOLIC BLOOD PRESSURE: 139 MMHG | DIASTOLIC BLOOD PRESSURE: 70 MMHG

## 2019-08-18 LAB
BACTERIA #/AREA URNS HPF: (no result) /HPF
BILIRUB UR QL STRIP: (no result)
BLOOD UR QL VISUAL: (no result)
PH UR STRIP: 6 [PH] (ref 5–7)
PROT UR STRIP-MCNC: (no result) MG/DL
RBC #/AREA URNS HPF: 2 /HPF (ref 0–6)
UROBILINOGEN UR-MCNC: 2 MG/DL (ref ?–2)
WBC #/AREA URNS HPF: 1 /HPF (ref 0–6)

## 2019-08-18 RX ADMIN — POVIDONE-IODINE STA APPLIC: 100 OINTMENT TOPICAL at 02:23

## 2019-08-18 RX ADMIN — POVIDONE-IODINE STA: 100 OINTMENT TOPICAL at 06:56

## 2019-08-18 NOTE — XRAY REPORT
AP PELVIS



INDICATION / CLINICAL INFORMATION:

Fall with pelvic pain and hip pain.



COMPARISON:

None available.

 

FINDINGS:



BONES / JOINT(S): The bones are diffusely demineralized. There is moderately advanced lower lumbar sp
ondylosis. I see no evidence of fracture or dislocation.

SOFT TISSUES: There are multiple clustered calcific densities lateral to the left greater trochanter 
which may be in a bursa.



ADDITIONAL FINDINGS: There are prominent atherosclerotic calcifications.



IMPRESSION: No acute osseous abnormality.



Signer Name: Ramesh May MD 

Signed: 8/18/2019 12:59 AM

 Workstation Name: [id number]

## 2019-08-18 NOTE — EMERGENCY DEPARTMENT REPORT
<ALBARO LIMON - Last Filed: 08/18/19 02:49>





ED General Adult HPI





- General


Chief complaint: Fall


Stated complaint: HAND LAC


Time Seen by Provider: 08/18/19 00:02





- Related Data


                                Home Medications











 Medication  Instructions  Recorded  Confirmed  Last Taken


 


ALPRAZolam 0.5 mg PO Q4H PRN 08/05/19 08/05/19 Unknown


 


Acetaminophen 650 mg PO Q4H PRN 08/05/19 08/05/19 Unknown


 


LORazepam 1 mg PO Q4H PRN 08/05/19 08/05/19 Unknown


 


Ondansetron 4 mg PO Q4H PRN 08/05/19 08/05/19 Unknown


 


SEROquel 100 mg PO BID 08/05/19 08/05/19 Unknown








                                  Previous Rx's











 Medication  Instructions  Recorded  Last Taken  Type


 


cephALEXin [Keflex] 500 mg PO Q12HR #10 cap 08/18/19 Unknown Rx











                                    Allergies











Allergy/AdvReac Type Severity Reaction Status Date / Time


 


No Known Allergies Allergy   Unverified 08/30/16 09:30














ED Past Medical Hx





- Medications


Home Medications: 


                                Home Medications











 Medication  Instructions  Recorded  Confirmed  Last Taken  Type


 


ALPRAZolam 0.5 mg PO Q4H PRN 08/05/19 08/05/19 Unknown History


 


Acetaminophen 650 mg PO Q4H PRN 08/05/19 08/05/19 Unknown History


 


LORazepam 1 mg PO Q4H PRN 08/05/19 08/05/19 Unknown History


 


Ondansetron 4 mg PO Q4H PRN 08/05/19 08/05/19 Unknown History


 


SEROquel 100 mg PO BID 08/05/19 08/05/19 Unknown History


 


cephALEXin [Keflex] 500 mg PO Q12HR #10 cap 08/18/19  Unknown Rx














- Laceration /Wound Repair


  ** Left Volar Hand


Wound Location: upper extremity (left volar hand between thumb and 2nd digit 4 

cm laceration rom intact no tendon sheath exposure cms intact )


Wound Length (cm): 4


Wound's Depth, Shape: into muscle, irregular


Wound Explored: clean


Irrigated w/ Saline (ccs): 120


Betadine Prep?: Yes


Anesthesia: 1% Lidocaine


Volume Anesthetic (ccs): 3


Wound Debrided: minimal


Wound Repaired With: sutures


Suture Size/Type: 5:0, proline


Number of Sutures: 23


Layer Closure?: Yes


Deep Layer Suture Size/Type: 4:0, chromic


Number Deep Layer Sutures: 4


Sterile Dressing Applied?: Yes


Progress: 


Left lower hand laceration between thumb and index finger range of motion is 

intact no exposed tendon sheath minimum muscle irritation range of motion 

remains intact flexion and extension of the thumb and index finger intact x-ray 

negative for fracture or dislocation bleeding controlled wound cleaned with 

sterile saline and Betadine solution anesthesia with 1% lidocaine 3 mL were 

irrigated 120 mL of sterile saline wound closed in layers 2 deep layer for 

prophylactic O 4 sutures external layer 5. 0 Prolene 23 sutures running edges 

are well approximated diabetes controlled range of motion remains intact patient

 tolerated procedure with minimal distress sterile dressing applied distal 

pulses intact CRT less than 3 patient and family were given wound care 

instructions were verbalized understanding of same. 








ED Disposition


Clinical Impression: 


 Fall, Laceration of left hand





Disposition: [id number]/TX-[id number] ANOTHER TYPE HLTHCARE


Condition: Stable


Instructions:  Suture Care (ED), Laceration (ED)


Additional Instructions: 


Cultures were sent today, and results will be available in the next 3-5 days.  

Have her primary-care doctor contact the medical records department to obtain 

culture results.  Continue current outpatient medications, and avoid medications

 that are sedating.  Take the antibiotics as directed, and follow-up for a wound

 check within the next 5-7 days.  Sutures should be taken out in 7 days.  

Patient may follow up with the primary care doctor, urgent care center, 

orthopedist, will return to the emergency room for suture removal.  Return to 

the emergency room right away with projectile vomiting, change in mental status,

 confusion, or new, worsening or different symptoms not present on the initial 

emergency room evaluation.





Prescriptions: 


cephALEXin [Keflex] 500 mg PO Q12HR #10 cap


Referrals: 


EDWAR SANCHEZ MD [Staff Physician] - 3-5 Days


[facility] [Provider Group] - 3-5 Days





<RAMSEY MONTALVO - Last Filed: 08/20/19 01:36>





ED General Adult HPI





- General


Source: EMS (verbal report received from EMS.ems notes not available at time of 

chart dictation), RN notes reviewed, old records reviewed


Mode of arrival: Stretcher


Limitations: Altered Mental Status, Physical Limitation, Other (the patient is 

demented.  The patient is a poor historian.)





- History of Present Illness


Initial comments: 





This is an 85-year-old female.  I have evaluated this patient in the past.  Her 

past medical history includes arthritis, high cholesterol, hypertension, 

dementia, heart disease with 2 stents.  Previously, her primary care doctor was 

Dr. Sterling.  We do not know who her primary care doctor is currently.  She is 

brought to the hospital by EMS.  EMS verbally reports that the patient had an 

unwitnessed presumed mechanical fall at her nursing home.  She was found to have

a large laceration on the web space of her left hand.  The patient has no 

recollection of this event.  She complains of left-sided knee pain.  She denies 

pain elsewhere.  She indicates the pain increases with palpation and range of 

motion.  She is not able to describe the qualitative nature of the pain.  She is

not sure if the pain moves anywhere.  No additional history is available at this

time.


-: Sudden


Location: left, upper extremity, lower extremity


Radiation: other


Quality: other


Consistency: other


Improves with: other


Worsens with: other





ED Review of Systems


ROS: 


Stated complaint: HAND LAC


Other details as noted in HPI





Comment: Unobtainable due to pts medical conditions





ED Past Medical Hx





- Past Medical History


Hx Hypertension: Yes


Hx Congestive Heart Failure: Yes


Hx Dementia: Yes


Additional medical history: cardiac stent x2, DJD neck





- Surgical History


Hx Cholecystectomy: Yes


Additional Surgical History: bilat knee replacement





- Social History


Smoking Status: Unknown if ever smoked





ED Physical Exam





- General


Limitations: Altered Mental Status, Physical Limitation, Other (the patient is 

demented.  The patient is a poor historian.)


General appearance: alert (the patient is alert to name.  The patient follows 

commands.)





- Head


Head exam: Present: atraumatic, normocephalic





- Eye


Eye exam: Present: normal appearance





- ENT


ENT exam: Present: normal orophraynx, normal external ear exam





- Neck


Neck exam: Present: normal inspection, full ROM.  Absent: tenderness, 

meningismus





- Respiratory


Respiratory exam: Present: normal lung sounds bilaterally.  Absent: respiratory 

distress, wheezes, rales, rhonchi, stridor, chest wall tenderness, accessory 

muscle use, decreased breath sounds, prolonged expiratory





- Cardiovascular


Cardiovascular Exam: Present: regular rate, normal rhythm, normal heart sounds. 

Absent: tachycardia, irregular rhythm





- GI/Abdominal


GI/Abdominal exam: Present: soft.  Absent: distended, tenderness, rebound, 

rigid, normal bowel sounds, pulsatile mass





- Extremities Exam


Extremities exam: Present: tenderness, other (2+ pulses noted in the bilateral 

upper, lower extremities.  Compartments soft.  No long bony tenderness.  The 

pelvis is stable.).  Absent: normal inspection (patient is found to have a 

swollen left and right knee.  It is no redness, pus or streaking.  There is no 

tenderness.  On the webspace of the left hand, there is a 5 cm deep laceration. 

No foreign bodies noted.  Patient is demented and therefore not able to 

cooperate or participate with a detailed tendon examination.  Sensation intact 

to light touch in the left upper extremity median, radial, ulnar distribution.  

Numerous chronic-appearing ecchymoses noted.)





- Back Exam


Back exam: Present: full ROM.  Absent: tenderness, CVA tenderness (R), CVA 

tenderness (L), paraspinal tenderness, vertebral tenderness





- Neurological Exam


Neurological exam: Present: alert (the patient is alert to name.  The patient 

follows commands.  The patient has baseline dementia.), other (there is 5 out of

5 strength in the bilateral upper, lower extremities.  Sensation is intact to 

light touch in the bilateral upper, lower extremity.  There is no facial droop. 

The tongue is midline.  The extraocular movements are intact bilaterally.  

Detailed neurologic examination is not completed secondary to underlying 

dementia.)





- Psychiatric


Psychiatric exam: Present: anxious





- Skin


Skin exam: Present: dry, ecchymosis





ED Course


                                   Vital Signs











  08/18/19 08/18/19 08/18/19





  00:12 01:00 06:00


 


Temperature 97.5 F L  97.5 F L


 


Pulse Rate 71  70


 


Respiratory 20 20 20





Rate   


 


Blood Pressure 161/73  


 


Blood Pressure   139/70





[Left]   


 


O2 Sat by Pulse 98 98 97





Oximetry   














- Reevaluation(s)


Reevaluation #1: 





08/18/19 00:43


Differential diagnosis, including but not limited to: Mechanical fall, hand 

laceration, arthritis, urinary tract infection, sprain, strain, fracture, 

dislocation





Assessment and plan: Elderly 85-year-old female status post unwitnessed presumed

 mechanical fall, found to have left hand laceration, unremarkable vital signs, 

physical exam otherwise unremarkable.  Plan to obtain CT scan of the brain, x-

ray left hand, x-ray of the pelvis, x-ray of the bilateral knees, and 

urinalysis.  We will give a tetanus vaccination, and I have requested that one 

of the [facility] providers repair the laceration








Once laceration repaired, we will discharge with a splint, prophylactic 

antibiotics, instructions for outpatient follow-up.


Reevaluation #2: 





08/18/19 01:34


Noncontrast CT scan of the brain is negative for acute disease.








X-ray of the pelvis, knee, hands demonstrates no acute traumatic injury.  

Multiple nonemergent presumed chronic findings noted.  Left-sided knee effusion 

is appreciated on x-ray.  This is consistent with her exam.  Range of motion 

intact to the left knee, this is very unlikely to be infectious.


Reevaluation #3: 





08/18/19 02:43


Urinalysis is not consistent with urinary tract infection.  Objective imaging 

studies unremarkable.  Patient will be discharged home to follow-up with an 

outpatient primary care doctor or orthopedist for suture removal.





ED Medical Decision Making





- Lab Data








                                   Vital Signs











  08/18/19





  00:12


 


Temperature 97.5 F L


 


Pulse Rate 71


 


Respiratory 20





Rate 


 


Blood Pressure 161/73


 


O2 Sat by Pulse 98





Oximetry 














- Radiology Data


Radiology results: pending, image reviewed


Critical care attestation.: 


If time is entered above; I have spent that time in minutes in the direct care 

of this critically ill patient, excluding procedure time.








ED Disposition


Is pt being admited?: No


Does the pt Need Aspirin: No


Time of Disposition: 02:44 (no nursing home)

## 2019-08-18 NOTE — XRAY REPORT
BILATERAL KNEES 4 VIEWS



INDICATION / CLINICAL INFORMATION:

Fall with bilateral knee pain.



COMPARISON:

None available.

 

FINDINGS:



BONES / JOINT(S): There are bilateral knee prostheses. There is no evidence of fracture or dislocatio
n. There is a small to moderate left knee joint effusion.

SOFT TISSUES: There is mild generalized soft tissue swelling involving the left knee.



ADDITIONAL FINDINGS: Prominent atherosclerotic calcifications are noted.



IMPRESSION: Generalized left knee soft tissue swelling with a small-to-moderate left knee joint effus
ion. No acute osseous abnormality.



Signer Name: Ramesh May MD 

Signed: 8/18/2019 1:01 AM

 Workstation Name: [id number]

## 2019-08-18 NOTE — XRAY REPORT
LEFT HAND 3 VIEWS



INDICATION / CLINICAL INFORMATION:

Fall with left hand pain and laceration.



COMPARISON:

None available.

 

FINDINGS:



BONES / JOINT(S): The bones are diffusely demineralized. There are generalized changes of osteoarthri
tis, most prominent involving the first carpometacarpal joint. There is an old unhealed fracture of t
he ulnar styloid. I see no evidence of acute fracture or dislocation.

SOFT TISSUES: Atherosclerotic calcifications are noted.



ADDITIONAL FINDINGS: There is a bandage overlying the dorsum of the hand. Nonspecific soft tissue irr
egularity is noted.



IMPRESSION: No acute osseous abnormality.



Signer Name: Ramesh May MD 

Signed: 8/18/2019 12:57 AM

 Workstation Name: [id number]

## 2019-08-18 NOTE — CAT SCAN REPORT
CT head/brain wo con 



INDICATION / CLINICAL INFORMATION:

Fall with head trauma and pain. Altered mental status.



TECHNIQUE:

All CT scans at this location are performed using CT dose reduction for ALARA by means of automated e
xposure control. 



COMPARISON:

6/9/2019.



FINDINGS:

There is mild to moderate generalized cerebral atrophy. There are mild small vessel ischemic changes 
in the periventricular white matter and basal ganglia. No focal lesion or mass effect is seen. There 
is no evidence of intracranial hemorrhage or major vessel occlusion.



The calvarium is intact. The visualized paranasal sinuses and mastoid air cells are clear.



IMPRESSION: No acute abnormality is identified. 



Signer Name: Ramesh May MD 

Signed: 8/18/2019 12:52 AM

 Workstation Name: [id number]

## 2019-10-04 ENCOUNTER — HOSPITAL ENCOUNTER (INPATIENT)
Dept: HOSPITAL 5 - ED | Age: 84
LOS: 3 days | Discharge: NURSING, REHAB OR LONG-TERM CARE | DRG: 641 | End: 2019-10-07
Attending: INTERNAL MEDICINE | Admitting: INTERNAL MEDICINE
Payer: MEDICARE

## 2019-10-04 DIAGNOSIS — E78.2: ICD-10-CM

## 2019-10-04 DIAGNOSIS — F01.50: ICD-10-CM

## 2019-10-04 DIAGNOSIS — Z79.899: ICD-10-CM

## 2019-10-04 DIAGNOSIS — E86.0: ICD-10-CM

## 2019-10-04 DIAGNOSIS — I50.42: ICD-10-CM

## 2019-10-04 DIAGNOSIS — I25.10: ICD-10-CM

## 2019-10-04 DIAGNOSIS — I11.0: ICD-10-CM

## 2019-10-04 DIAGNOSIS — E87.6: Primary | ICD-10-CM

## 2019-10-04 DIAGNOSIS — Z96.653: ICD-10-CM

## 2019-10-04 DIAGNOSIS — R41.82: ICD-10-CM

## 2019-10-04 DIAGNOSIS — G93.49: ICD-10-CM

## 2019-10-04 DIAGNOSIS — Z90.49: ICD-10-CM

## 2019-10-04 DIAGNOSIS — Z95.5: ICD-10-CM

## 2019-10-04 DIAGNOSIS — R65.10: ICD-10-CM

## 2019-10-04 DIAGNOSIS — E44.1: ICD-10-CM

## 2019-10-04 DIAGNOSIS — Z82.49: ICD-10-CM

## 2019-10-04 LAB
ALBUMIN SERPL-MCNC: 2.5 G/DL (ref 3.9–5)
ALT SERPL-CCNC: < 5 UNITS/L (ref 7–56)
BASOPHILS # (AUTO): 0 K/MM3 (ref 0–0.1)
BASOPHILS NFR BLD AUTO: 0.3 % (ref 0–1.8)
BILIRUB UR QL STRIP: (no result)
BLOOD UR QL VISUAL: (no result)
BUN SERPL-MCNC: 14 MG/DL (ref 7–17)
BUN/CREAT SERPL: 28 %
CALCIUM SERPL-MCNC: 7.8 MG/DL (ref 8.4–10.2)
EOSINOPHIL # BLD AUTO: 0 K/MM3 (ref 0–0.4)
EOSINOPHIL NFR BLD AUTO: 0 % (ref 0–4.3)
HCT VFR BLD CALC: 30.1 % (ref 30.3–42.9)
HEMOLYSIS INDEX: 0
HGB BLD-MCNC: 10.9 GM/DL (ref 10.1–14.3)
LYMPHOCYTES # BLD AUTO: 0.5 K/MM3 (ref 1.2–5.4)
LYMPHOCYTES NFR BLD AUTO: 5.4 % (ref 13.4–35)
MCHC RBC AUTO-ENTMCNC: 36 % (ref 30–34)
MCV RBC AUTO: 97 FL (ref 79–97)
MONOCYTES # (AUTO): 0.5 K/MM3 (ref 0–0.8)
MONOCYTES % (AUTO): 5.3 % (ref 0–7.3)
MUCOUS THREADS #/AREA URNS HPF: (no result) /HPF
PH UR STRIP: 6 [PH] (ref 5–7)
PLATELET # BLD: 214 K/MM3 (ref 140–440)
PROT UR STRIP-MCNC: (no result) MG/DL
RBC # BLD AUTO: 3.08 M/MM3 (ref 3.65–5.03)
RBC #/AREA URNS HPF: 2 /HPF (ref 0–6)
UROBILINOGEN UR-MCNC: < 2 MG/DL (ref ?–2)
WBC #/AREA URNS HPF: 3 /HPF (ref 0–6)

## 2019-10-04 PROCEDURE — 85730 THROMBOPLASTIN TIME PARTIAL: CPT

## 2019-10-04 PROCEDURE — 83735 ASSAY OF MAGNESIUM: CPT

## 2019-10-04 PROCEDURE — 93005 ELECTROCARDIOGRAM TRACING: CPT

## 2019-10-04 PROCEDURE — 96367 TX/PROPH/DG ADDL SEQ IV INF: CPT

## 2019-10-04 PROCEDURE — 81001 URINALYSIS AUTO W/SCOPE: CPT

## 2019-10-04 PROCEDURE — 93010 ELECTROCARDIOGRAM REPORT: CPT

## 2019-10-04 PROCEDURE — 94760 N-INVAS EAR/PLS OXIMETRY 1: CPT

## 2019-10-04 PROCEDURE — 71045 X-RAY EXAM CHEST 1 VIEW: CPT

## 2019-10-04 PROCEDURE — 96365 THER/PROPH/DIAG IV INF INIT: CPT

## 2019-10-04 PROCEDURE — 82805 BLOOD GASES W/O2 SATURATION: CPT

## 2019-10-04 PROCEDURE — 80053 COMPREHEN METABOLIC PANEL: CPT

## 2019-10-04 PROCEDURE — 36415 COLL VENOUS BLD VENIPUNCTURE: CPT

## 2019-10-04 PROCEDURE — 85025 COMPLETE CBC W/AUTO DIFF WBC: CPT

## 2019-10-04 PROCEDURE — 82140 ASSAY OF AMMONIA: CPT

## 2019-10-04 PROCEDURE — 74177 CT ABD & PELVIS W/CONTRAST: CPT

## 2019-10-04 PROCEDURE — 87040 BLOOD CULTURE FOR BACTERIA: CPT

## 2019-10-04 PROCEDURE — 80048 BASIC METABOLIC PNL TOTAL CA: CPT

## 2019-10-04 RX ADMIN — POTASSIUM CHLORIDE SCH MLS/HR: 10 INJECTION, SOLUTION INTRAVENOUS at 18:37

## 2019-10-04 RX ADMIN — POTASSIUM CHLORIDE SCH MLS/HR: 10 INJECTION, SOLUTION INTRAVENOUS at 21:48

## 2019-10-04 RX ADMIN — POTASSIUM CHLORIDE SCH MLS/HR: 10 INJECTION, SOLUTION INTRAVENOUS at 23:08

## 2019-10-04 RX ADMIN — POTASSIUM CHLORIDE SCH MLS/HR: 10 INJECTION, SOLUTION INTRAVENOUS at 20:15

## 2019-10-04 NOTE — CAT SCAN REPORT
CT abdomen pelvis w con 



INDICATION / CLINICAL INFORMATION:

fever,ABD PAIN  pain.



TECHNIQUE:

Axial CT imaging of abdomen and pelvis was obtained with contrast. Coronal and sagittal reformatted i
maging obtained and reviewed.  All CT scans at this location are performed using CT dose reduction fo
r ALARA by means of automated exposure control. 



COMPARISON:

None available.



FINDINGS:

CT abdomen with contrast demonstrates grossly normal appearance of the spleen, pancreas, kidneys, and
 adrenal glands. There is a subcapsular cystic mass  along the posterior aspect of the right hepatic 
lobe measuring 4.7 x 2.1 cm. Review of prior chest CT from 2009 demonstrates this to be a cavernous h
emangioma. There is no interval change.. Extensive calcific plaque is seen throughout the abdominal a
demetria, common iliac arteries, and abdominal arteries.



CT pelvis demonstrates a calcified mass within the uterus measuring approximately 4 cm. The appearanc
e is most suggestive of a submucosal fibroid. No additional pelvic mass or free fluid is identified. 
Moderate amount stool seen throughout the sigmoid colon and rectum consistent with constipation. No e
vidence for bowel obstruction.



Visualized lung bases show some minimal bibasilar atelectasis but no suggestion of basilar pneumonia 
or significant pleural effusion.



Prominent degenerative changes noted throughout the visualized spine and pelvis. There is severe comp
ression fracture of L1 with mild retropulsion. Compression fracture was not present on radiographs fr
om 2017.



IMPRESSION:

1. No etiology is seen within the abdomen or pelvis to account for the patient's presentation of feve
r.

2. Significant atherosclerotic plaque is seen throughout the abdominal aorta and common iliac arterie
s.

3. 4 cm uterine fibroid 

4.  hepatic hemangioma unchanged from 2009.

5. Severe compression fracture of L1 with mild retropulsion.

6. Constipation.



Signer Name: Sarah Ayoub MD 

Signed: 10/4/2019 8:31 PM

 Workstation Name: [id number]

## 2019-10-04 NOTE — HISTORY AND PHYSICAL REPORT
History of Present Illness


History of present illness: 


85-year-old woman with a history of coronary artery disease, hypertension, 

dementia, hyperlipidemia, CHF was sent to the   emergency room for evaluation of

fever, patient is unable to give a history 





PAST MEDICAL HISTORY: coronary artery disease, hypertension, dementia, 

hyperlipidemia, CHF





PAST SURGICAL HISTORY: Cholecystectomy, bilateral knee replacement





SOCIAL HISTORY: Denies alcohol, tobacco, drugs





FAMILY HISTORY: Hypertension








Medications and Allergies


                                    Allergies











Allergy/AdvReac Type Severity Reaction Status Date / Time


 


No Known Allergies Allergy   Unverified 08/30/16 09:30











                                Home Medications











 Medication  Instructions  Recorded  Confirmed  Last Taken  Type


 


ALPRAZolam 0.5 mg PO Q4H PRN 08/05/19 10/04/19 Unknown History


 


Acetaminophen 650 mg PO Q4H PRN 08/05/19 10/04/19 Unknown History


 


Ondansetron 4 mg PO Q4H PRN 08/05/19 10/04/19 Unknown History


 


SEROquel 100 mg PO BID 08/05/19 10/04/19 Unknown History


 


cephALEXin [Keflex] 500 mg PO Q12HR #10 cap 08/18/19 10/04/19 Unknown Rx


 


AtorvaSTATin [Lipitor] 10 mg PO QHS #30 tablet 10/07/19  Unknown Rx


 


Carvedilol [Coreg] 3.125 mg PO BID #60 tablet 10/07/19  Unknown Rx


 


Donepezil [Aricept] 5 mg PO QHS #30 tablet 10/07/19  Unknown Rx











Active Meds: 


Active Medications





Potassium Chloride (Kcl 10meq/100ml)  10 meq in 100 mls @ 100 mls/hr IV Q1H REY


   Stop: 10/04/19 22:59


   Last Admin: 10/04/19 21:48 Dose:  100 mls/hr


   Documented by: 











Exam





- Physical Exam


Narrative exam: 


General Apperance: The patient sitting in bed no acute distress


HEENT: Normocephalic, atraumatic.  Pupils equally round and reactive to light, 

extraocular movement intact, and no sclericterus or JVD or thyromegaly or 

nodule.  Neck supple, no carotid bruit, mucous membranes moist, no exudate or 

erythema


Heart: S1-S2, regular is rhythm


Lungs: Clear to auscultation bilaterally, breathing comfortable


Abdomen: Positive bowel sounds, soft, nontender, nondistended, no organomegaly


Extremities: No edema cyanosis clubbing


Skin: no rash, nodule, warm and dry


Neuro:CN 2 -12 intact, motor/sensory intact, speech is fluent








- Constitutional


Vitals: 


                                        











Temp Pulse Resp BP Pulse Ox


 


 98.9 F   90   17   133/52   97 


 


 10/04/19 19:17  10/04/19 20:30  10/04/19 20:30  10/04/19 21:16  10/04/19 21:16














Results





- Labs


CBC & Chem 7: 


                                 10/06/19 10:39





                                 10/07/19 06:00


Labs: 


                              Abnormal lab results











  10/04/19 10/04/19 10/04/19 Range/Units





  17:07 17:07 Unknown 


 


RBC  3.08 L    (3.65-5.03)  M/mm3


 


Hct  30.1 L    (30.3-42.9)  %


 


MCH  35 H    (28-32)  pg


 


MCHC  36 H    (30-34)  %


 


RDW  17.0 H    (13.2-15.2)  %


 


Lymph % (Auto)  5.4 L    (13.4-35.0)  %


 


Lymph #  0.5 L    (1.2-5.4)  K/mm3


 


Seg Neutrophils %  89.0 H    (40.0-70.0)  %


 


Seg Neutrophils #  8.8 H    (1.8-7.7)  K/mm3


 


VBG pH    7.486 H  (7.320-7.420)  


 


Potassium   2.0 L*   (3.6-5.0)  mmol/L


 


Carbon Dioxide   31 H   (22-30)  mmol/L


 


Creatinine   0.5 L   (0.7-1.2)  mg/dL


 


Calcium   7.8 L   (8.4-10.2)  mg/dL


 


ALT   < 5 L   (7-56)  units/L


 


Total Protein   5.8 L   (6.3-8.2)  g/dL


 


Albumin   2.5 L   (3.9-5)  g/dL














- Imaging and Cardiology


CT scan - abdomen: report reviewed


CT scan - pelvis: report reviewed





Assessment and Plan


Assessment


Sirs


coronary artery disease


hypertension


dementia


hyperlipidemia,


CHF, stsble





Plan


Admit to medicine


Start IV Rocephin, follow cultures


Continue appropriate outpatient medications


DVT prophylaxis

## 2019-10-04 NOTE — EMERGENCY DEPARTMENT REPORT
ED Fever HPI





- General


Chief Complaint: Fever


Stated Complaint: SEPSIS


Time Seen by Provider: 10/04/19 17:40


Source: family, EMS





- History of Present Illness


Initial Comments: 





85-year-old female sent from assisted living facility for fever and altered 

mental status.  Daughter at bedside, reports patient has history of dementia.  

Daughter states she saw the patient on yesterday, and patient was her normal 

self.  Patient is normally talkative, active, has a good appetite.  Patient is 

not ambulatory, states she is wheelchair-bound.  Today, patient has been as 

active, did not recognize her daughter and had a fever of 103.4.  Patient 

reports mild lower abdominal pain.


Timing/Duration: this afternoon


Fever Severity/Quality: greater than 102 F


Associated Symptoms: abdominal pain





ED Review of Systems


ROS: 


Stated complaint: SEPSIS


Other details as noted in HPI





Comment: Unobtainable due to pts medical conditions


Gastrointestinal: abdominal pain





ED Past Medical Hx





- Past Medical History


Hx Hypertension: Yes


Hx Congestive Heart Failure: Yes


Hx Dementia: Yes


Additional medical history: cardiac stent x2, DJD neck





- Surgical History


Hx Cholecystectomy: Yes


Additional Surgical History: bilat knee replacement





- Social History


Smoking Status: Unknown if ever smoked





- Medications


Home Medications: 


                                Home Medications











 Medication  Instructions  Recorded  Confirmed  Last Taken  Type


 


ALPRAZolam 0.5 mg PO Q4H PRN 08/05/19 10/04/19 Unknown History


 


Acetaminophen 650 mg PO Q4H PRN 08/05/19 10/04/19 Unknown History


 


LORazepam 1 mg PO Q4H PRN 08/05/19 10/04/19 Unknown History


 


Ondansetron 4 mg PO Q4H PRN 08/05/19 10/04/19 Unknown History


 


SEROquel 100 mg PO BID 08/05/19 10/04/19 Unknown History


 


cephALEXin [Keflex] 500 mg PO Q12HR #10 cap 08/18/19 10/04/19 Unknown Rx














ED Physical Exam





- General


Limitations: Altered Mental Status, Physical Limitation


General appearance: alert, in no apparent distress





- Head


Head exam: Present: atraumatic, normocephalic





- Eye


Eye exam: Present: normal appearance





- ENT


ENT exam: Present: mucous membranes moist





- Neck


Neck exam: Present: normal inspection





- Respiratory


Respiratory exam: Present: normal lung sounds bilaterally.  Absent: respiratory 

distress





- Cardiovascular


Cardiovascular Exam: Present: regular rate, normal rhythm





- GI/Abdominal


GI/Abdominal exam: Present: soft, tenderness (mild suprapubic tenderness).  

Absent: distended





- Extremities Exam


Extremities exam: Present: normal inspection





- Neurological Exam


Neurological exam: Present: alert, altered (oriented to self)





- Psychiatric


Psychiatric exam: Present: normal affect, normal mood





- Skin


Skin exam: Present: warm, dry, intact, normal color





ED Course


                                   Vital Signs











  10/04/19 10/04/19 10/04/19





  16:57 16:58 17:00


 


Temperature   


 


Pulse Rate 107 H  108 H


 


Respiratory 30 H 25 H 26 H





Rate   


 


Blood Pressure 138/47  143/44


 


Blood Pressure   





[Left]   


 


O2 Sat by Pulse 96  96





Oximetry   














  10/04/19 10/04/19 10/04/19





  17:16 17:30 17:42


 


Temperature   101.8 F H


 


Pulse Rate 97 H 98 H 


 


Respiratory 17 23 





Rate   


 


Blood Pressure 143/44 146/56 


 


Blood Pressure   





[Left]   


 


O2 Sat by Pulse 95  





Oximetry   














  10/04/19 10/04/19 10/04/19





  17:46 18:00 18:16


 


Temperature   


 


Pulse Rate 99 H 101 H 95 H


 


Respiratory 12 19 22





Rate   


 


Blood Pressure 146/56 131/49 131/49


 


Blood Pressure   





[Left]   


 


O2 Sat by Pulse  95 95





Oximetry   














  10/04/19 10/04/19 10/04/19





  18:17 18:30 18:46


 


Temperature   


 


Pulse Rate 94 H 92 H 89


 


Respiratory 21 23 21





Rate   


 


Blood Pressure  131/49 122/45


 


Blood Pressure 131/49  





[Left]   


 


O2 Sat by Pulse 97 97 96





Oximetry   














  10/04/19 10/04/19 10/04/19





  18:56 19:00 19:16


 


Temperature   


 


Pulse Rate 90 89 91 H


 


Respiratory 18 18 12





Rate   


 


Blood Pressure  122/45 109/43


 


Blood Pressure 122/45  





[Left]   


 


O2 Sat by Pulse 97 98 98





Oximetry   














  10/04/19 10/04/19 10/04/19





  19:17 19:30 20:10


 


Temperature 98.9 F  


 


Pulse Rate  94 H 88


 


Respiratory  13 20





Rate   


 


Blood Pressure  108/48 108/48


 


Blood Pressure   





[Left]   


 


O2 Sat by Pulse  97 





Oximetry   














  10/04/19 10/04/19 10/04/19





  20:15 20:30 20:46


 


Temperature   


 


Pulse Rate 85 90 


 


Respiratory 20 17 





Rate   


 


Blood Pressure 118/51 124/56 124/56


 


Blood Pressure   





[Left]   


 


O2 Sat by Pulse 96 97 98





Oximetry   














  10/04/19 10/04/19





  21:00 21:16


 


Temperature  


 


Pulse Rate  


 


Respiratory  





Rate  


 


Blood Pressure 133/52 133/52


 


Blood Pressure  





[Left]  


 


O2 Sat by Pulse 97 97





Oximetry  














ED Medical Decision Making





- Lab Data


Result diagrams: 


                                 10/04/19 17:07





                                 10/04/19 17:07





- EKG Data


-: EKG Interpreted by Me


EKG shows normal: sinus rhythm, axis, intervals, QRS complexes, ST-T waves


Rate: normal





- EKG Data


Interpretation: nonspecific ST-T wave lenny





- Radiology Data


Radiology results: report reviewed, image reviewed





- Medical Decision Making





85-year-old female with fever of unknown origin.  Vital signs have been stable. 

 Temperature improved with Tylenol.  WBCs and lactic acid normal.  UA, chest x-

ray, CT abdomen and pelvis all normal.  Blood and urine cultures taken, cefepime

 given.  Potassium, however, is 2.0.  40 mEq given IV.  Mental status is 

improving, she is more talkative, yelling that she needs help.  Still is not 

oriented, however patient does have history of dementia.  Patient will be 

admitted to hospitalist for further management.





- Differential Diagnosis


UTI, pneumonia, diverticulitis, appendicitis


Critical care attestation.: 


If time is entered above; I have spent that time in minutes in the direct care 

of this critically ill patient, excluding procedure time.








ED Disposition


Clinical Impression: 


 Fever, Altered mental status, Hypokalemia





Disposition: DC-09 OP ADMIT IP TO THIS HOSP


Is pt being admited?: Yes


Condition: Stable


Time of Disposition: 20:42

## 2019-10-04 NOTE — XRAY REPORT
CHEST 1 VIEW



INDICATION: 

possible Sepsis.



COMPARISON: 

5/27/2017



FINDINGS:

SUPPORT DEVICES: None.

HEART / MEDIASTINUM: No significant abnormality.

LUNGS / PLEURA: Poor inspiratory effort. No significant pulmonary or pleural abnormality. No pneumoth
orax. 



ADDITIONAL FINDINGS: Bones are osteopenic with degenerative changes of both shoulders



IMPRESSION:

1. No acute findings.



Signer Name: Jose Francisco Martínez MD 

Signed: 10/4/2019 5:53 PM

 Workstation Name: [id number]

## 2019-10-05 LAB
BASOPHILS # (AUTO): 0 K/MM3 (ref 0–0.1)
BASOPHILS NFR BLD AUTO: 0.1 % (ref 0–1.8)
BUN SERPL-MCNC: 12 MG/DL (ref 7–17)
BUN/CREAT SERPL: 40 %
CALCIUM SERPL-MCNC: 7.6 MG/DL (ref 8.4–10.2)
EOSINOPHIL # BLD AUTO: 0 K/MM3 (ref 0–0.4)
EOSINOPHIL NFR BLD AUTO: 0 % (ref 0–4.3)
HCT VFR BLD CALC: 29.9 % (ref 30.3–42.9)
HEMOLYSIS INDEX: 2
HGB BLD-MCNC: 10.3 GM/DL (ref 10.1–14.3)
LYMPHOCYTES # BLD AUTO: 1.2 K/MM3 (ref 1.2–5.4)
LYMPHOCYTES NFR BLD AUTO: 14.6 % (ref 13.4–35)
MCHC RBC AUTO-ENTMCNC: 34 % (ref 30–34)
MCV RBC AUTO: 98 FL (ref 79–97)
MONOCYTES # (AUTO): 0.5 K/MM3 (ref 0–0.8)
MONOCYTES % (AUTO): 6.7 % (ref 0–7.3)
PLATELET # BLD: 190 K/MM3 (ref 140–440)
RBC # BLD AUTO: 3.04 M/MM3 (ref 3.65–5.03)

## 2019-10-05 RX ADMIN — Medication SCH ML: at 10:06

## 2019-10-05 RX ADMIN — CEFTRIAXONE SODIUM SCH MLS/HR: 2 INJECTION, POWDER, FOR SOLUTION INTRAMUSCULAR; INTRAVENOUS at 10:06

## 2019-10-05 RX ADMIN — POTASSIUM CHLORIDE SCH MEQ: 1500 TABLET, EXTENDED RELEASE ORAL at 14:17

## 2019-10-05 RX ADMIN — ENOXAPARIN SODIUM SCH MG: 100 INJECTION SUBCUTANEOUS at 09:10

## 2019-10-05 RX ADMIN — Medication SCH ML: at 21:10

## 2019-10-05 RX ADMIN — POTASSIUM CHLORIDE SCH MEQ: 1500 TABLET, EXTENDED RELEASE ORAL at 19:19

## 2019-10-05 RX ADMIN — POTASSIUM CHLORIDE SCH MEQ: 1500 TABLET, EXTENDED RELEASE ORAL at 22:43

## 2019-10-05 NOTE — PROGRESS NOTE
Assessment and Plan





- Patient Problems


(1) SIRS (systemic inflammatory response syndrome)


Current Visit: Yes   Status: Acute   


Plan to address problem: 


No source of infection identified


Febrile and with altered sensorium.


Empiric abx for now


CXR and U/a and CT abd negative








(2) Hypokalemia


Current Visit: Yes   Status: Acute   


Plan to address problem: 


Supplement agressively


IV fluids for now








(3) Dehydration


Current Visit: No   Status: Acute   


Plan to address problem: 


IV fluids for now








(4) CHF (congestive heart failure)


Current Visit: Yes   Status: Chronic   


Qualifiers: 


   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 


Plan to address problem: 


History wise CHF


Echo for EF


Not on any diuretics








(5) Hypertension


Current Visit: No   Status: Chronic   


Qualifiers: 


   Hypertension type: essential hypertension   Qualified Code(s): I10 - 

Essential (primary) hypertension   


Plan to address problem: 


On Losartan 25 mg po qd








(6) CAD (coronary artery disease)


Current Visit: No   Status: Chronic   


Qualifiers: 


   Coronary Disease-Associated Artery/Lesion type: native artery   Native vs. 

transplanted heart: native heart 


Plan to address problem: 


On ASA 81 mg po qd








(7) HLD (hyperlipidemia)


Current Visit: Yes   Status: Chronic   


Qualifiers: 


   Hyperlipidemia type: mixed hyperlipidemia   Qualified Code(s): E78.2 - Mixed 

hyperlipidemia   


Plan to address problem: 


On Statins








(8) Dementia


Current Visit: Yes   Status: Chronic   


Qualifiers: 


   Dementia type: vascular dementia 


Plan to address problem: 


On Seroquel for agitation


Aricept 5 mg po qd added








(9) DVT prophylaxis


Current Visit: Yes   Status: Acute   


Plan to address problem: 


on Heparin and gi prophylaxis








Subjective


Date of service: 10/05/19


Principal diagnosis: SIRS,Fever


Interval history: 


85-year-old female sent from assisted living facility for fever and altered 

mental status.  Daughter at bedside, reports patient has history of dementia.  

Daughter states she saw the patient on yesterday, and patient was her normal 

self.  Patient is normally talkative, active, has a good appetite.  Patient is 

not ambulatory, states she is wheelchair-bound. On the day of admission patient 

has been not active, did not recognize her daughter and had a fever of 103.4.  

Patient reports mild lower abdominal pain.








Objective





- Constitutional


Vitals: 


                               Vital Signs - 12hr











  10/05/19 10/05/19 10/05/19





  03:00 07:22 07:44


 


Temperature 97.4 F L  97.9 F


 


Pulse Rate 77  63


 


Pulse Rate [   





Right Radial]   


 


Respiratory 16  20





Rate   


 


Blood Pressure 167/79  146/67


 


O2 Sat by Pulse 100 99 100





Oximetry   














  10/05/19 10/05/19





  11:47 12:53


 


Temperature  98.4 F


 


Pulse Rate  59 L


 


Pulse Rate [ 63 





Right Radial]  


 


Respiratory 20 18





Rate  


 


Blood Pressure  159/57


 


O2 Sat by Pulse 100 100





Oximetry  











General appearance: Present: no acute distress, well-nourished





- EENT


Eyes: PERRL, EOM intact


ENT: hearing intact, clear oral mucosa


Ears: bilateral: normal





- Neck


Neck: supple, normal ROM





- Respiratory


Respiratory effort: normal


Respiratory: bilateral: CTA





- Breasts


Breasts: normal





- Cardiovascular


Heart rate: 88


Rhythm: regular


Heart Sounds: Present: S1 & S2.  Absent: gallop, rub


Extremities: no ischemia, pulses intact, No edema, normal color, Full ROM





- Gastrointestinal


General gastrointestinal: Present: soft, non-tender, non-distended, normal bowel

sounds





- Genitourinary


Female genitourinary: normal





- Integumentary


Integumentary: clear, warm, dry





- Musculoskeletal


Musculoskeletal: generalized weakness





- Neurologic


Neurologic: CNII-XII intact, moves all extremities





- Psychiatric


Psychiatric: appropriate mood/affect, other (Alert but not oriented)





- Labs


CBC & Chem 7: 


                                 10/05/19 05:12





                                 10/05/19 05:12


Labs: 


                              Abnormal lab results











  10/04/19 10/04/19 10/04/19 Range/Units





  17:07 17:07 Unknown 


 


RBC  3.08 L    (3.65-5.03)  M/mm3


 


Hct  30.1 L    (30.3-42.9)  %


 


MCV     (79-97)  fl


 


MCH  35 H    (28-32)  pg


 


MCHC  36 H    (30-34)  %


 


RDW  17.0 H    (13.2-15.2)  %


 


Lymph % (Auto)  5.4 L    (13.4-35.0)  %


 


Lymph #  0.5 L    (1.2-5.4)  K/mm3


 


Seg Neutrophils %  89.0 H    (40.0-70.0)  %


 


Seg Neutrophils #  8.8 H    (1.8-7.7)  K/mm3


 


VBG pH    7.486 H  (7.320-7.420)  


 


Potassium   2.0 L*   (3.6-5.0)  mmol/L


 


Carbon Dioxide   31 H   (22-30)  mmol/L


 


Creatinine   0.5 L   (0.7-1.2)  mg/dL


 


Calcium   7.8 L   (8.4-10.2)  mg/dL


 


ALT   < 5 L   (7-56)  units/L


 


Total Protein   5.8 L   (6.3-8.2)  g/dL


 


Albumin   2.5 L   (3.9-5)  g/dL














  10/05/19 10/05/19 Range/Units





  05:12 05:12 


 


RBC  3.04 L   (3.65-5.03)  M/mm3


 


Hct  29.9 L   (30.3-42.9)  %


 


MCV  98 H   (79-97)  fl


 


MCH  34 H   (28-32)  pg


 


MCHC    (30-34)  %


 


RDW  17.0 H   (13.2-15.2)  %


 


Lymph % (Auto)    (13.4-35.0)  %


 


Lymph #    (1.2-5.4)  K/mm3


 


Seg Neutrophils %  78.6 H   (40.0-70.0)  %


 


Seg Neutrophils #    (1.8-7.7)  K/mm3


 


VBG pH    (7.320-7.420)  


 


Potassium   2.3 L*  (3.6-5.0)  mmol/L


 


Carbon Dioxide   32 H  (22-30)  mmol/L


 


Creatinine   0.3 L  (0.7-1.2)  mg/dL


 


Calcium   7.6 L  (8.4-10.2)  mg/dL


 


ALT    (7-56)  units/L


 


Total Protein    (6.3-8.2)  g/dL


 


Albumin    (3.9-5)  g/dL











CT Abdomen/pelvis


IMPRESSION:


1. No etiology is seen within the abdomen or pelvis to account for the patient's

presentation of fever.


2. Significant atherosclerotic plaque is seen throughout the abdominal aorta and

common iliac arteries.


3. 4 cm uterine fibroid 


4. hepatic hemangioma unchanged from 2009.


5. Severe compression fracture of L1 with mild retropulsion. 


6. Constipation











- Imaging and cardiology


EKG: report reviewed


Chest x-ray: report reviewed ([facility])

## 2019-10-06 LAB
BASOPHILS # (AUTO): 0 K/MM3 (ref 0–0.1)
BASOPHILS NFR BLD AUTO: 0.4 % (ref 0–1.8)
BUN SERPL-MCNC: 9 MG/DL (ref 7–17)
BUN/CREAT SERPL: 23 %
CALCIUM SERPL-MCNC: 7.5 MG/DL (ref 8.4–10.2)
EOSINOPHIL # BLD AUTO: 0 K/MM3 (ref 0–0.4)
EOSINOPHIL NFR BLD AUTO: 0 % (ref 0–4.3)
HCT VFR BLD CALC: 29.7 % (ref 30.3–42.9)
HEMOLYSIS INDEX: 5
HGB BLD-MCNC: 10.3 GM/DL (ref 10.1–14.3)
LYMPHOCYTES # BLD AUTO: 1.5 K/MM3 (ref 1.2–5.4)
LYMPHOCYTES NFR BLD AUTO: 33 % (ref 13.4–35)
MCHC RBC AUTO-ENTMCNC: 35 % (ref 30–34)
MCV RBC AUTO: 99 FL (ref 79–97)
MONOCYTES # (AUTO): 0.4 K/MM3 (ref 0–0.8)
MONOCYTES % (AUTO): 9.3 % (ref 0–7.3)
PLATELET # BLD: 196 K/MM3 (ref 140–440)
RBC # BLD AUTO: 2.99 M/MM3 (ref 3.65–5.03)

## 2019-10-06 RX ADMIN — Medication SCH ML: at 09:28

## 2019-10-06 RX ADMIN — POTASSIUM CHLORIDE SCH MEQ: 1500 TABLET, EXTENDED RELEASE ORAL at 02:58

## 2019-10-06 RX ADMIN — ENOXAPARIN SODIUM SCH MG: 100 INJECTION SUBCUTANEOUS at 09:26

## 2019-10-06 RX ADMIN — HEPARIN SODIUM SCH UNIT: 5000 INJECTION, SOLUTION INTRAVENOUS; SUBCUTANEOUS at 22:30

## 2019-10-06 RX ADMIN — Medication SCH ML: at 22:30

## 2019-10-06 RX ADMIN — CEFTRIAXONE SODIUM SCH MLS/HR: 2 INJECTION, POWDER, FOR SOLUTION INTRAMUSCULAR; INTRAVENOUS at 09:25

## 2019-10-06 NOTE — PROGRESS NOTE
Assessment and Plan





- Patient Problems


(1) SIRS (systemic inflammatory response syndrome)


Current Visit: Yes   Status: Acute   


Plan to address problem: 


No source of infection identified


Febrile and with altered sensorium.


Empiric abx for now


CXR and U/a and CT abd negative








(2) Hypokalemia


Current Visit: Yes   Status: Acute   


Plan to address problem: 


Supplement agressively


IV fluids for now








(3) Dehydration


Current Visit: No   Status: Acute   


Plan to address problem: 


IV fluids for now








(4) CHF (congestive heart failure)


Current Visit: Yes   Status: Chronic   


Qualifiers: 


   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 


Plan to address problem: 


History wise CHF


Echo for EF


Not on any diuretics








(5) Hypertension


Current Visit: No   Status: Chronic   


Qualifiers: 


   Hypertension type: essential hypertension   Qualified Code(s): I10 - 

Essential (primary) hypertension   


Plan to address problem: 


On Losartan 25 mg po qd








(6) CAD (coronary artery disease)


Current Visit: No   Status: Chronic   


Qualifiers: 


   Coronary Disease-Associated Artery/Lesion type: native artery   Native vs. 

transplanted heart: native heart 


Plan to address problem: 


On ASA 81 mg po qd








(7) HLD (hyperlipidemia)


Current Visit: Yes   Status: Chronic   


Qualifiers: 


   Hyperlipidemia type: mixed hyperlipidemia   Qualified Code(s): E78.2 - Mixed 

hyperlipidemia   


Plan to address problem: 


On Statins








(8) Dementia


Current Visit: Yes   Status: Chronic   


Qualifiers: 


   Dementia type: vascular dementia 


Plan to address problem: 


On Seroquel for agitation


Aricept 5 mg po qd added








(9) DVT prophylaxis


Current Visit: Yes   Status: Acute   


Plan to address problem: 


on Heparin and gi prophylaxis








Subjective


Date of service: 10/06/19


Principal diagnosis: SIRS,Fever


Interval history: 


85-year-old female sent from assisted living facility for fever and altered 

mental status.  Daughter at bedside, reports patient has history of dementia.  

Daughter states she saw the patient on yesterday, and patient was her normal 

self.  Patient is normally talkative, active, has a good appetite.  Patient is 

not ambulatory, states she is wheelchair-bound. On the day of admission patient 

has been not active, did not recognize her daughter and had a fever of 103.4.  

Patient reports mild lower abdominal pain.








Objective





- Constitutional


Vitals: 


                               Vital Signs - 12hr











  10/06/19 10/06/19 10/06/19





  01:46 07:10 07:19


 


Temperature 97.8 F 97.4 F L 


 


Pulse Rate 63 72 


 


Pulse Rate [   





Right Radial]   


 


Respiratory 18 18 





Rate   


 


Blood Pressure 142/60 155/73 


 


O2 Sat by Pulse 100 100 98





Oximetry   














  10/06/19





  10:00


 


Temperature 


 


Pulse Rate 68


 


Pulse Rate [ 72





Right Radial] 


 


Respiratory 18





Rate 


 


Blood Pressure 


 


O2 Sat by Pulse 100





Oximetry 











General appearance: Present: no acute distress, well-nourished





- EENT


Eyes: PERRL, EOM intact


ENT: hearing intact, clear oral mucosa


Ears: bilateral: normal





- Neck


Neck: supple, normal ROM





- Respiratory


Respiratory effort: normal


Respiratory: bilateral: CTA





- Breasts


Breasts: normal





- Cardiovascular


Rhythm: regular


Heart Sounds: Present: S1 & S2.  Absent: gallop, rub


Extremities: pulses intact, No edema, normal color, Full ROM





- Gastrointestinal


General gastrointestinal: Present: soft, non-tender, non-distended, normal bowel

sounds





- Genitourinary


Female genitourinary: normal





- Integumentary


Integumentary: clear, warm, dry





- Musculoskeletal


Musculoskeletal: 1, strength equal bilaterally





- Neurologic


Neurologic: moves all extremities





- Psychiatric


Psychiatric: memory intact, appropriate mood/affect, intact judgment & insight





- Labs


CBC & Chem 7: 


                                 10/05/19 05:12





                                 10/05/19 05:12

## 2019-10-07 VITALS — SYSTOLIC BLOOD PRESSURE: 140 MMHG | DIASTOLIC BLOOD PRESSURE: 59 MMHG

## 2019-10-07 LAB
BUN SERPL-MCNC: 7 MG/DL (ref 7–17)
BUN/CREAT SERPL: 23 %
CALCIUM SERPL-MCNC: 7.6 MG/DL (ref 8.4–10.2)
HEMOLYSIS INDEX: 4

## 2019-10-07 RX ADMIN — HEPARIN SODIUM SCH UNIT: 5000 INJECTION, SOLUTION INTRAVENOUS; SUBCUTANEOUS at 09:01

## 2019-10-07 RX ADMIN — CEFTRIAXONE SODIUM SCH MLS/HR: 2 INJECTION, POWDER, FOR SOLUTION INTRAMUSCULAR; INTRAVENOUS at 09:07

## 2019-10-07 RX ADMIN — Medication SCH ML: at 09:02

## 2019-10-07 RX ADMIN — POTASSIUM CHLORIDE SCH MLS/HR: 10 INJECTION, SOLUTION INTRAVENOUS at 08:35

## 2019-10-07 RX ADMIN — POTASSIUM CHLORIDE SCH: 10 INJECTION, SOLUTION INTRAVENOUS at 10:00

## 2019-10-07 RX ADMIN — POTASSIUM CHLORIDE SCH: 10 INJECTION, SOLUTION INTRAVENOUS at 10:01

## 2019-10-07 NOTE — DISCHARGE SUMMARY
Providers





- Providers


Date of Admission: 


10/04/19 22:34





Attending physician: 


COCO HUDSON MD





Primary care physician: 


[facility], [place]








Hospitalization


Reason for admission: sirs


Condition: Stable


Hospital course: 


85-year-old female sent from assisted living facility for fever and altered 

mental status.  Daughter at bedside, reports patient has history of dementia.  

Daughter states she saw the patient on day prior to admission, and patient was 

her normal self.  Patient is normally talkative, active, has a good appetite.  

Patient is not ambulatory, states she is wheelchair-bound. On the day of 

admission patient has been not active, did not recognize her daughter and had a 

fever of 103.4.  Patient reports mild lower abdominal pain.








- Patient Problems


(1) SIRS (systemic inflammatory response syndrome)


Current Visit: Yes   Status: Acute   


Plan to address problem: 


No source of infection identified


Febrile and with altered sensorium now back to baseline 


Treated with emperic anbx, now discontinued


CXR and U/a and CT abd negative


Possible underlying Viral syndrome now resolved. 


Can return to nursing home.


Called and left message for the daughter





(2) Hypokalemia


Current Visit: Yes   Status: Acute   


Plan to address problem: 


Supplement aggressively


IV fluids for now








(3) Dehydration


Current Visit: No   Status: Acute   


Plan to address problem: 


Treated with fluids





(4) CHF (congestive heart failure)


Current Visit: Yes   Status: Chronic   


Qualifiers: 


   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 


Plan to address problem: 


History wise CHF


Echo for EF


Not on any diuretics








(5) Hypertension


Current Visit: No   Status: Chronic   


Qualifiers: 


   Hypertension type: essential hypertension   Qualified Code(s): I10 - 

Essential (primary) hypertension   


Plan to address problem: 


On Losartan 25 mg po qd








(6) CAD (coronary artery disease)


Current Visit: No   Status: Chronic   


Qualifiers: 


   Coronary Disease-Associated Artery/Lesion type: native artery   Native vs. 

transplanted heart: native heart 


Plan to address problem: 


On ASA 81 mg po qd








(7) HLD (hyperlipidemia)


Current Visit: Yes   Status: Chronic   


Qualifiers: 


   Hyperlipidemia type: mixed hyperlipidemia   Qualified Code(s): E78.2 - Mixed 

hyperlipidemia   


Plan to address problem: 


On Statins








(8) Dementia


Current Visit: Yes   Status: Chronic   


Qualifiers: 


   Dementia type: vascular dementia 


Plan to address problem: 


On Seroquel for agitation


Aricept 5 mg po qd added





Disposition: [id number]


Time spent for discharge: 35 mins





Core Measure Documentation





- Palliative Care


Palliative Care/ Comfort Measures: Not Applicable





- Core Measures


Any of the following diagnoses?: none





Exam





- Physical Exam


Narrative exam: 


General appearance: Present: no acute distress, well-nourished





- EENT


Eyes: PERRL, EOM intact


ENT: hearing intact, clear oral mucosa


Ears: bilateral: normal





- Neck


Neck: supple, normal ROM





- Respiratory


Respiratory effort: normal


Respiratory: bilateral: CTA





- Breasts


Breasts: normal





- Cardiovascular


Heart rate: 88


Rhythm: regular


Heart Sounds: Present: S1 & S2.  Absent: gallop, rub


Extremities: no ischemia, pulses intact, No edema, normal color, Full ROM





- Gastrointestinal


General gastrointestinal: Present: soft, non-tender, non-distended, normal bowel

sounds





- Genitourinary


Female genitourinary: normal





- Integumentary


Integumentary: clear, warm, dry





- Musculoskeletal


Musculoskeletal: generalized weakness





- Neurologic


Neurologic: CNII-XII intact, moves all extremities





- Psychiatric


Psychiatric: appropriate mood/affect, other (Alert and oriented to person)





-











- Constitutional


Vitals: 


                                        











Temp Pulse Resp BP Pulse Ox


 


 97.8 F   56 L  20   140/59   98 


 


 10/07/19 07:28  10/07/19 07:28  10/07/19 07:28  10/07/19 07:28  10/07/19 07:36














Plan


Activity: advance as tolerated, fall precautions


Diet: low fat


Special Instructions: record daily BP diary


Care Plan Goals: 


Improved mental status, better bp control


Follow up with: 


[facility], [place] [Primary Care Provider] - 7 Days


Prescriptions: 


Donepezil [Aricept] 5 mg PO QHS #30 tablet


AtorvaSTATin [Lipitor] 10 mg PO QHS #30 tablet


Carvedilol [Coreg] 3.125 mg PO BID #60 tablet